# Patient Record
Sex: MALE | Race: WHITE
[De-identification: names, ages, dates, MRNs, and addresses within clinical notes are randomized per-mention and may not be internally consistent; named-entity substitution may affect disease eponyms.]

---

## 2017-05-05 ENCOUNTER — HOSPITAL ENCOUNTER (EMERGENCY)
Dept: HOSPITAL 25 - ED | Age: 82
Discharge: HOME | End: 2017-05-05
Payer: MEDICARE

## 2017-05-05 VITALS — DIASTOLIC BLOOD PRESSURE: 44 MMHG | SYSTOLIC BLOOD PRESSURE: 133 MMHG

## 2017-05-05 DIAGNOSIS — Z95.5: ICD-10-CM

## 2017-05-05 DIAGNOSIS — I25.10: ICD-10-CM

## 2017-05-05 DIAGNOSIS — F17.210: ICD-10-CM

## 2017-05-05 DIAGNOSIS — J44.9: ICD-10-CM

## 2017-05-05 DIAGNOSIS — R07.9: Primary | ICD-10-CM

## 2017-05-05 DIAGNOSIS — I71.4: ICD-10-CM

## 2017-05-05 DIAGNOSIS — I45.10: ICD-10-CM

## 2017-05-05 DIAGNOSIS — E11.9: ICD-10-CM

## 2017-05-05 LAB
ALBUMIN SERPL BCG-MCNC: 3.6 G/DL (ref 3.2–5.2)
ALP SERPL-CCNC: 123 U/L (ref 34–104)
ALT SERPL W P-5'-P-CCNC: 273 U/L (ref 7–52)
ANION GAP SERPL CALC-SCNC: 7 MMOL/L (ref 2–11)
AST SERPL-CCNC: 378 U/L (ref 13–39)
BUN SERPL-MCNC: 31 MG/DL (ref 6–24)
BUN/CREAT SERPL: 16.5 (ref 8–20)
CALCIUM SERPL-MCNC: 8.7 MG/DL (ref 8.6–10.3)
CHLORIDE SERPL-SCNC: 103 MMOL/L (ref 101–111)
GLOBULIN SER CALC-MCNC: 2.8 G/DL (ref 2–4)
GLUCOSE SERPL-MCNC: 183 MG/DL (ref 70–100)
HCO3 SERPL-SCNC: 25 MMOL/L (ref 22–32)
HCT VFR BLD AUTO: 33 % (ref 42–52)
HGB BLD-MCNC: 11 G/DL (ref 14–18)
MCH RBC QN AUTO: 33 PG (ref 27–31)
MCHC RBC AUTO-ENTMCNC: 33 G/DL (ref 31–36)
MCV RBC AUTO: 99 FL (ref 80–94)
POTASSIUM SERPL-SCNC: 4.8 MMOL/L (ref 3.5–5)
PROT SERPL-MCNC: 6.4 G/DL (ref 6.4–8.9)
RBC # BLD AUTO: 3.37 10^6/UL (ref 4–5.4)
SODIUM SERPL-SCNC: 135 MMOL/L (ref 133–145)
TROPONIN I SERPL-MCNC: 0.03 NG/ML (ref ?–0.04)
WBC # BLD AUTO: 6.4 10^3/UL (ref 3.5–10.8)

## 2017-05-05 PROCEDURE — 84484 ASSAY OF TROPONIN QUANT: CPT

## 2017-05-05 PROCEDURE — 76705 ECHO EXAM OF ABDOMEN: CPT

## 2017-05-05 PROCEDURE — 80053 COMPREHEN METABOLIC PANEL: CPT

## 2017-05-05 PROCEDURE — 36415 COLL VENOUS BLD VENIPUNCTURE: CPT

## 2017-05-05 PROCEDURE — 71010: CPT

## 2017-05-05 PROCEDURE — 93005 ELECTROCARDIOGRAM TRACING: CPT

## 2017-05-05 PROCEDURE — 85025 COMPLETE CBC W/AUTO DIFF WBC: CPT

## 2017-05-05 PROCEDURE — 85610 PROTHROMBIN TIME: CPT

## 2017-05-05 PROCEDURE — 99283 EMERGENCY DEPT VISIT LOW MDM: CPT

## 2017-05-05 PROCEDURE — 83605 ASSAY OF LACTIC ACID: CPT

## 2017-05-05 NOTE — RAD
Indication: Chest pain.



Single frontal view of the chest performed at 1500 hours was reviewed.



Comparison is made with previous exam dated July 01, 2016.



Cardiomegaly. Lung fields demonstrate no pleural fluid, pneumonia or pneumothorax.



IMPRESSION: CARDIOMEGALY WITHOUT EVIDENCE OF ACTIVE CARDIOPULMONARY DISEASE.

## 2017-05-05 NOTE — RAD
HISTORY: Abdominal pain



COMPARISONS: CT dated June 26, 2007



TECHNIQUE: Multiple transverse and longitudinal ultrasound images were obtained of the

right upper quadrant of the abdomen using grayscale and color Doppler imaging.



FINDINGS:



The study is limited by patient bowel gas.





LIVER: The liver is normal in shape, size, contour, and echogenicity. There are no focal

parenchymal masses.  There is normal hepatopedal flow of the portal vein on Doppler

imaging.

BILIARY TREE: There is ectasia of the common duct. The common duct measures 1.1 cm.

GALLBLADDER: The patient is status post cholecystectomy.



PANCREAS: The pancreas is obscured by overlying bowel gas.



RIGHT KIDNEY: The right kidney is normal in shape, size, contour, and echogenicity.  There

is no hydronephrosis or nephrolithiasis. The right kidney measures 10 x 5 x 4.4 cm. 



AORTA AND IVC: Obscured by overlying bowel     



FLUID: There are no pleural effusions. There is no free fluid within the hepatorenal

recess.



OTHER FINDINGS: None.



IMPRESSION: 

1.  LIMITED STUDY.

2.  STATUS POST MASTECTOMY.

3.  ECTASIA OF THE COMMON BILE DUCT.

## 2017-05-05 NOTE — ED
James MACIAS Benjamin, scribed for Kurt Hollye MD on 05/05/17 at 1557 .





HPI Chest Pain





- HPI Summary


HPI Summary: 





89yo male c/o chest pain last night around 11pm that lasted about 2 hours, Pt 

describes the pain as sharp and heavy. Pt has hx of CAD and cardiac stents. Pt 

had a stent placed in 1 month ago. Pt came to ED after consulting his doctor. 








- History of Current Complaint


Chief Complaint: EDChestPainROMI


Time Seen by Provider: 05/05/17 14:44


Hx Obtained From: Patient


Onset/Duration: Started Hours Ago - 18 hours ago, Resolved


Timing: Constant


Initial Severity: Moderate


Current Severity: Mild


Pain Intensity: 0


Chest Pain Location: Diffuse


Chest Pain Radiates: No


Character: Pressure/Squeezing, Sharp/Stabbing


Aggravating Factor(s): Nothing


Alleviating Factor(s): Nothing


Associated Signs and Symptoms: Positive: Negative





- Additional Pertinent History


Primary Care Physician: [facility]





- Allergy/Home Medications


Allergies/Adverse Reactions: 


 Allergies











Allergy/AdvReac Type Severity Reaction Status Date / Time


 


No Known Allergies Allergy   Verified 08/16/15 13:23














PMH/Surg Hx/FS Hx/Imm Hx


Endocrine/Hematology History: Reports: Hx Diabetes


Cardiovascular History: Reports: Hx Aneurysm - AAA, Hx Angina, Hx Coronary 

Artery Disease - STENT, Hx Hypertension, Other Cardiovascular Problems/

Disorders - CAD, IDDM II


   Denies: Hx Congestive Heart Failure


Respiratory History: Reports: Hx Chronic Obstructive Pulmonary Disease (COPD)


 History: 


   Denies: Hx Dialysis, Hx Renal Disease


Sensory History: Reports: Hx Contacts or Glasses, Hx Hearing Aid - left


Opthamlomology History: Reports: Hx Contacts or Glasses





- Cancer History


Cancer Type, Location and Year: prostate





- Surgical History


Surgery Procedure, Year, and Place: cholecystectomy, [facility] w/ 4 stents,2013, 

right leg artery replacement, anerysm stent leg,


Infectious Disease History: No


Infectious Disease History: 


   Denies: Traveled Outside the [place] in Last 30 Days





- Family History


Known Family History: Positive: Cardiac Disease





- Social History


Occupation: Retired


Lives: With Family


Alcohol Use: None


Substance Use Type: Reports: None


Hx Tobacco Use: Yes


Smoking Status (MU): Current Every Day Smoker


Type: Cigarettes


Amount Used/How Often: 12- 14 cigarettes a day


Have You Smoked in the Last Year: Yes





Review of Systems


Constitutional: Negative


Eyes: Negative


ENT: Negative


Positive: Chest Pain - resolved now


Respiratory: Negative


Gastrointestinal: Negative


Genitourinary: Negative


Musculoskeletal: Negative


Skin: Negative


Neurological: Negative


Psychological: Normal


All Other Systems Reviewed And Are Negative: Yes





Physical Exam


Triage Information Reviewed: Yes


Vital Signs On Initial Exam: 


 Initial Vitals











Temp Pulse Resp BP Pulse Ox


 


 97.1 F   51   20   151/53   100 


 


 05/05/17 14:24  05/05/17 14:24  05/05/17 14:24  05/05/17 14:24  05/05/17 14:24











Vital Signs Reviewed: Yes


Appearance: Positive: Well-Appearing, No Pain Distress, Well-Nourished


Skin: Positive: Warm, Skin Color Reflects Adequate Perfusion, Dry


Head/Face: Positive: Normal Head/Face Inspection


Eyes: Positive: Normal, EOMI, NAYELY


ENT: Positive: Normal ENT inspection, Hearing grossly normal, TMs normal


Neck: Positive: Supple, Nontender


Respiratory/Lung Sounds: Positive: Clear to Auscultation, Breath Sounds Present


Cardiovascular: Positive: RRR


Abdomen Description: Positive: Nontender, No Organomegaly, Soft


Bowel Sounds: Positive: Present


Musculoskeletal: Positive: Normal, Strength/ROM Intact


Neurological: Positive: Normal, Sensory/Motor Intact, Alert, Oriented to Person 

Place, Time, CN Intact II-III


Psychiatric: Positive: Affect/Mood Appropriate





- Schoolcraft Coma Scale


Coma Scale Total: 15





Diagnostics





- Vital Signs


 Vital Signs











  Temp Pulse Resp BP Pulse Ox


 


 05/05/17 14:59   44  27   99


 


 05/05/17 14:45   48   


 


 05/05/17 14:44   52  23   97


 


 05/05/17 14:43     172/58 


 


 05/05/17 14:27  97.9 F  50  16  151/53  99


 


 05/05/17 14:24  97.1 F  51  20  151/53  100














- Laboratory


Lab Results: 


 Lab Results











  05/05/17 05/05/17 05/05/17 Range/Units





  15:15 15:15 15:15 


 


WBC  6.4    (3.5-10.8)  10^3/ul


 


RBC  3.37 L    (4.0-5.4)  10^6/ul


 


Hgb  11.0 L    (14.0-18.0)  g/dl


 


Hct  33 L    (42-52)  %


 


MCV  99 H    (80-94)  fL


 


MCH  33 H    (27-31)  pg


 


MCHC  33    (31-36)  g/dl


 


RDW  15    (10.5-15)  %


 


Plt Count  135 L    (150-450)  10^3/ul


 


MPV  8    (7.4-10.4)  um3


 


Neut % (Auto)  81.0    (38-83)  %


 


Lymph % (Auto)  7.1 L    (25-47)  %


 


Mono % (Auto)  6.4    (1-9)  %


 


Eos % (Auto)  4.3    (0-6)  %


 


Baso % (Auto)  1.2    (0-2)  %


 


Absolute Neuts (auto)  5.2    (1.5-7.7)  10^3/ul


 


Absolute Lymphs (auto)  0.5 L    (1.0-4.8)  10^3/ul


 


Absolute Monos (auto)  0.4    (0-0.8)  10^3/ul


 


Absolute Eos (auto)  0.3    (0-0.6)  10^3/ul


 


Absolute Basos (auto)  0.1    (0-0.2)  10^3/ul


 


Absolute Nucleated RBC  0    10^3/ul


 


Nucleated RBC %  0    


 


INR (Anticoag Therapy)     (0.89-1.11)  


 


Sodium   135   (133-145)  mmol/L


 


Potassium   4.8   (3.5-5.0)  mmol/L


 


Chloride   103   (101-111)  mmol/L


 


Carbon Dioxide   25   (22-32)  mmol/L


 


Anion Gap   7   (2-11)  mmol/L


 


BUN   31 H   (6-24)  mg/dL


 


Creatinine   1.88 H   (0.67-1.17)  mg/dL


 


Est GFR ( Amer)   43.8   (>60)  


 


Est GFR (Non-Af Amer)   34.0   (>60)  


 


BUN/Creatinine Ratio   16.5   (8-20)  


 


Glucose   183 H   ()  mg/dL


 


Lactic Acid    2.0  (0.5-2.0)  mmol/L


 


Calcium   8.7   (8.6-10.3)  mg/dL


 


Total Bilirubin   1.10 H   (0.2-1.0)  mg/dL


 


AST   378 H   (13-39)  U/L


 


ALT   273 H   (7-52)  U/L


 


Alkaline Phosphatase   123 H   ()  U/L


 


Troponin I   0.03   (<0.04)  ng/mL


 


Total Protein   6.4   (6.4-8.9)  g/dL


 


Albumin   3.6   (3.2-5.2)  g/dL


 


Globulin   2.8   (2-4)  g/dL


 


Albumin/Globulin Ratio   1.3   (1-3)  














  05/05/17 Range/Units





  15:15 


 


WBC   (3.5-10.8)  10^3/ul


 


RBC   (4.0-5.4)  10^6/ul


 


Hgb   (14.0-18.0)  g/dl


 


Hct   (42-52)  %


 


MCV   (80-94)  fL


 


MCH   (27-31)  pg


 


MCHC   (31-36)  g/dl


 


RDW   (10.5-15)  %


 


Plt Count   (150-450)  10^3/ul


 


MPV   (7.4-10.4)  um3


 


Neut % (Auto)   (38-83)  %


 


Lymph % (Auto)   (25-47)  %


 


Mono % (Auto)   (1-9)  %


 


Eos % (Auto)   (0-6)  %


 


Baso % (Auto)   (0-2)  %


 


Absolute Neuts (auto)   (1.5-7.7)  10^3/ul


 


Absolute Lymphs (auto)   (1.0-4.8)  10^3/ul


 


Absolute Monos (auto)   (0-0.8)  10^3/ul


 


Absolute Eos (auto)   (0-0.6)  10^3/ul


 


Absolute Basos (auto)   (0-0.2)  10^3/ul


 


Absolute Nucleated RBC   10^3/ul


 


Nucleated RBC %   


 


INR (Anticoag Therapy)  1.01  (0.89-1.11)  


 


Sodium   (133-145)  mmol/L


 


Potassium   (3.5-5.0)  mmol/L


 


Chloride   (101-111)  mmol/L


 


Carbon Dioxide   (22-32)  mmol/L


 


Anion Gap   (2-11)  mmol/L


 


BUN   (6-24)  mg/dL


 


Creatinine   (0.67-1.17)  mg/dL


 


Est GFR ( Amer)   (>60)  


 


Est GFR (Non-Af Amer)   (>60)  


 


BUN/Creatinine Ratio   (8-20)  


 


Glucose   ()  mg/dL


 


Lactic Acid   (0.5-2.0)  mmol/L


 


Calcium   (8.6-10.3)  mg/dL


 


Total Bilirubin   (0.2-1.0)  mg/dL


 


AST   (13-39)  U/L


 


ALT   (7-52)  U/L


 


Alkaline Phosphatase   ()  U/L


 


Troponin I   (<0.04)  ng/mL


 


Total Protein   (6.4-8.9)  g/dL


 


Albumin   (3.2-5.2)  g/dL


 


Globulin   (2-4)  g/dL


 


Albumin/Globulin Ratio   (1-3)  











Result Diagrams: 


 05/05/17 15:15





 05/05/17 15:15


Lab Statement: Any lab studies that have been ordered have been reviewed, and 

results considered in the medical decision making process.





- Radiology


  ** CXR


Xray Interpretation: No Acute Changes - IMPRESSION: CARDIOMEGALY WITHOUT 

EVIDENCE OF ACTIVE CARDIOPULMONARY DISEASE.


Radiology Interpretation Completed By: Radiologist





- Ultrasound


  ** No standard instances


Ultrasound Interpretation: No Acute Changes - US Abdomen - IMPRESSION:  1.  

LIMITED STUDY. 2.  STATUS POST MASTECTOMY. 3.  ECTASIA OF THE COMMON BILE DUCT.


Ultrasound Interpretation Completed By: Radiologist





- EKG


  ** 1431.


Cardiac Rate: Bradycardia


EKG Rhythm: Sinus Bradycardia


EKG Interpretation: RBBB. 


EKG Comparison: No Significant Change - compared to 7/2/16





Chest Pain Course/Dx





- Course


Course Of Treatment: Mr. Fuentes came in about 12 hours after an episode of 

chest pain that lasted a couple hours. His W/U was unremarkable for cardiac 

issues but I did find that his transaminases were elevated.  I R/U'd a common 

bile stone with U/S.  It is possible that his high dose of lipitor is 

responsible and I recommended that he hold it and F/U first of the week.





- Diagnoses


Provider Diagnoses: 


 Chest pain








- Provider Notifications


Discussed Care Of Patient With: Dr. Corbin (Interventionalist) @5630.





Discharge





- Discharge Plan


Condition: Stable


Disposition: HOME


Patient Education Materials:  Chest Pain (ED)


Referrals: 


Lori Hitchcock MD [Medical Doctor] - 


Additional Instructions: 


Your liver enzymes were a little elevated today.  This could be from your 

cholesterol medication and I would recommend holding it until the first of the 

week when you should follow up with your doctor at [facility].





The documentation as recorded by the James hammer Benjamin accurately reflects 

the service I personally performed and the decisions made by me, Kutr Holley MD.

## 2018-05-07 ENCOUNTER — HOSPITAL ENCOUNTER (OUTPATIENT)
Dept: HOSPITAL 25 - ED | Age: 83
Setting detail: OBSERVATION
LOS: 1 days | Discharge: HOME | End: 2018-05-08
Attending: HOSPITALIST | Admitting: INTERNAL MEDICINE
Payer: MEDICARE

## 2018-05-07 DIAGNOSIS — E83.42: ICD-10-CM

## 2018-05-07 DIAGNOSIS — R79.89: ICD-10-CM

## 2018-05-07 DIAGNOSIS — I10: ICD-10-CM

## 2018-05-07 DIAGNOSIS — I12.9: ICD-10-CM

## 2018-05-07 DIAGNOSIS — I25.10: ICD-10-CM

## 2018-05-07 DIAGNOSIS — Z95.9: ICD-10-CM

## 2018-05-07 DIAGNOSIS — N40.1: ICD-10-CM

## 2018-05-07 DIAGNOSIS — R47.89: Primary | ICD-10-CM

## 2018-05-07 DIAGNOSIS — N18.3: ICD-10-CM

## 2018-05-07 DIAGNOSIS — J44.9: ICD-10-CM

## 2018-05-07 DIAGNOSIS — E78.5: ICD-10-CM

## 2018-05-07 DIAGNOSIS — R74.8: ICD-10-CM

## 2018-05-07 DIAGNOSIS — E11.22: ICD-10-CM

## 2018-05-07 DIAGNOSIS — R25.1: ICD-10-CM

## 2018-05-07 DIAGNOSIS — Z79.82: ICD-10-CM

## 2018-05-07 DIAGNOSIS — F32.9: ICD-10-CM

## 2018-05-07 LAB
BASOPHILS # BLD AUTO: 0.1 10^3/UL (ref 0–0.2)
EOSINOPHIL # BLD AUTO: 0.2 10^3/UL (ref 0–0.6)
HCT VFR BLD AUTO: 34 % (ref 42–52)
HGB BLD-MCNC: 11.5 G/DL (ref 14–18)
INR PPP/BLD: 0.9 (ref 0.77–1.02)
LYMPHOCYTES # BLD AUTO: 0.9 10^3/UL (ref 1–4.8)
MCH RBC QN AUTO: 34 PG (ref 27–31)
MCHC RBC AUTO-ENTMCNC: 34 G/DL (ref 31–36)
MCV RBC AUTO: 99 FL (ref 80–94)
MONOCYTES # BLD AUTO: 0.7 10^3/UL (ref 0–0.8)
NEUTROPHILS # BLD AUTO: 6.2 10^3/UL (ref 1.5–7.7)
NRBC # BLD AUTO: 0 10^3/UL
NRBC BLD QL AUTO: 0.1
PLATELET # BLD AUTO: 151 10^3/UL (ref 150–450)
RBC # BLD AUTO: 3.44 10^6/UL (ref 4–5.4)
WBC # BLD AUTO: 8.1 10^3/UL (ref 3.5–10.8)

## 2018-05-07 PROCEDURE — 85610 PROTHROMBIN TIME: CPT

## 2018-05-07 PROCEDURE — 80061 LIPID PANEL: CPT

## 2018-05-07 PROCEDURE — 85730 THROMBOPLASTIN TIME PARTIAL: CPT

## 2018-05-07 PROCEDURE — 86901 BLOOD TYPING SEROLOGIC RH(D): CPT

## 2018-05-07 PROCEDURE — G0480 DRUG TEST DEF 1-7 CLASSES: HCPCS

## 2018-05-07 PROCEDURE — 84439 ASSAY OF FREE THYROXINE: CPT

## 2018-05-07 PROCEDURE — 82550 ASSAY OF CK (CPK): CPT

## 2018-05-07 PROCEDURE — 96375 TX/PRO/DX INJ NEW DRUG ADDON: CPT

## 2018-05-07 PROCEDURE — 80048 BASIC METABOLIC PNL TOTAL CA: CPT

## 2018-05-07 PROCEDURE — 83516 IMMUNOASSAY NONANTIBODY: CPT

## 2018-05-07 PROCEDURE — 83605 ASSAY OF LACTIC ACID: CPT

## 2018-05-07 PROCEDURE — 84479 ASSAY OF THYROID (T3 OR T4): CPT

## 2018-05-07 PROCEDURE — 85652 RBC SED RATE AUTOMATED: CPT

## 2018-05-07 PROCEDURE — 86200 CCP ANTIBODY: CPT

## 2018-05-07 PROCEDURE — 70450 CT HEAD/BRAIN W/O DYE: CPT

## 2018-05-07 PROCEDURE — 36415 COLL VENOUS BLD VENIPUNCTURE: CPT

## 2018-05-07 PROCEDURE — G0378 HOSPITAL OBSERVATION PER HR: HCPCS

## 2018-05-07 PROCEDURE — 83735 ASSAY OF MAGNESIUM: CPT

## 2018-05-07 PROCEDURE — 96374 THER/PROPH/DIAG INJ IV PUSH: CPT

## 2018-05-07 PROCEDURE — 94640 AIRWAY INHALATION TREATMENT: CPT

## 2018-05-07 PROCEDURE — 99285 EMERGENCY DEPT VISIT HI MDM: CPT

## 2018-05-07 PROCEDURE — 87086 URINE CULTURE/COLONY COUNT: CPT

## 2018-05-07 PROCEDURE — 86900 BLOOD TYPING SEROLOGIC ABO: CPT

## 2018-05-07 PROCEDURE — 86038 ANTINUCLEAR ANTIBODIES: CPT

## 2018-05-07 PROCEDURE — 84484 ASSAY OF TROPONIN QUANT: CPT

## 2018-05-07 PROCEDURE — 80053 COMPREHEN METABOLIC PANEL: CPT

## 2018-05-07 PROCEDURE — 86140 C-REACTIVE PROTEIN: CPT

## 2018-05-07 PROCEDURE — 93306 TTE W/DOPPLER COMPLETE: CPT

## 2018-05-07 PROCEDURE — 82140 ASSAY OF AMMONIA: CPT

## 2018-05-07 PROCEDURE — 93005 ELECTROCARDIOGRAM TRACING: CPT

## 2018-05-07 PROCEDURE — 71045 X-RAY EXAM CHEST 1 VIEW: CPT

## 2018-05-07 PROCEDURE — 85025 COMPLETE CBC W/AUTO DIFF WBC: CPT

## 2018-05-07 PROCEDURE — 95819 EEG AWAKE AND ASLEEP: CPT

## 2018-05-07 PROCEDURE — 80320 DRUG SCREEN QUANTALCOHOLS: CPT

## 2018-05-07 PROCEDURE — 86850 RBC ANTIBODY SCREEN: CPT

## 2018-05-07 PROCEDURE — 84443 ASSAY THYROID STIM HORMONE: CPT

## 2018-05-07 PROCEDURE — 70551 MRI BRAIN STEM W/O DYE: CPT

## 2018-05-07 PROCEDURE — 81003 URINALYSIS AUTO W/O SCOPE: CPT

## 2018-05-07 PROCEDURE — 87040 BLOOD CULTURE FOR BACTERIA: CPT

## 2018-05-07 PROCEDURE — 81015 MICROSCOPIC EXAM OF URINE: CPT

## 2018-05-07 PROCEDURE — 93880 EXTRACRANIAL BILAT STUDY: CPT

## 2018-05-07 PROCEDURE — 80307 DRUG TEST PRSMV CHEM ANLYZR: CPT

## 2018-05-07 RX ADMIN — INSULIN LISPRO SCH: 100 INJECTION, SOLUTION INTRAVENOUS; SUBCUTANEOUS at 13:35

## 2018-05-07 RX ADMIN — BUPROPION HYDROCHLORIDE SCH MG: 150 TABLET, EXTENDED RELEASE ORAL at 20:37

## 2018-05-07 RX ADMIN — HEPARIN SODIUM SCH UNITS: 5000 INJECTION INTRAVENOUS; SUBCUTANEOUS at 14:51

## 2018-05-07 RX ADMIN — HEPARIN SODIUM SCH UNITS: 5000 INJECTION INTRAVENOUS; SUBCUTANEOUS at 21:45

## 2018-05-07 RX ADMIN — INSULIN LISPRO SCH UNIT: 100 INJECTION, SOLUTION INTRAVENOUS; SUBCUTANEOUS at 17:32

## 2018-05-07 RX ADMIN — METOPROLOL TARTRATE PRN MG: 5 INJECTION, SOLUTION INTRAVENOUS at 10:38

## 2018-05-07 RX ADMIN — METOPROLOL TARTRATE PRN MG: 5 INJECTION, SOLUTION INTRAVENOUS at 20:45

## 2018-05-07 NOTE — HP
CC:  Dr. Lori Hitchcock; Dr. Sheron Bethea, [place], [place] *

 

HISTORY AND PHYSICAL:

 

DATE OF ADMISSION:  05/07/18

 

PRIMARY CARE PROVIDER:  Dr. Lori Hitchcock, [place] and Dr. Sheron Bethea, 
[place].

 

ATTENDING PHYSICIAN:  Dr. Aria Jones * (dictated by Miriam Farr, DANIELE).

 

CHIEF COMPLAINT:  Trouble speaking and tremors.

 

HISTORY OF PRESENT ILLNESS:  Mr. Fuentes is an 89-year-old male with past 
medical history significant for coronary artery disease; peripheral vascular 
disease; diabetes mellitus, non-insulin dependent; prostate cancer, status post 
radiation; hypertension; COPD; pulmonary fibrosis, who states that he had been 
in his usual state of health until last night.  The patient states that 5 or 6 
days ago, he was in [place] and seen by his primary care provider.  He then 
travelled to [place] where he spends his summers.  The patient's wife went 
into the hospital 2 days ago.  The patient denies any recent fevers, chest pain
, shortness of breath, nausea, vomiting, diarrhea.  He states he had dizziness 
last evening, has not been sleeping well.  His blood pressures have been 
elevated.  At approximately 3 a.m. this morning, he noticed he was having whole 
body tremors and trouble speaking. This lasted for approximately an hour and a 
half and he called EMS.  Please note that the patient states he has not been 
taking his home medications accordingly since his wife has been in the 
hospital.  He states that his diabetes and blood pressure are well controlled 
when he takes his medications accordingly.  According to EMS, the patient was 
having stuttering speech during his tremors.  He was found to have a blood 
pressure of 190/110 and he was brought to the emergency room for further 
evaluation.

 

While in the emergency room, the patient continued to be noted to be 
hypertensive with systolic blood pressures in the 200s.  He had a brain CT 
showing no acute findings.  He had a chest x-ray showing cardiomegaly, no acute 
findings.  He had an EKG with no changes from previous EKG from 2017.  Due to 
the patient's neurological complaints, Neurology was consulted.  The patient 
was a tPA candidate due to his symptoms being vague and no exact window.  Due 
to his age, he is not an intervention candidate.  Hospitalists were asked to 
evaluate the patient for admission.

 

PAST MEDICAL HISTORY:

1.  Coronary artery disease.

2.  Peripheral vascular disease.

3.  Macular degeneration.

4.  Non-insulin dependent diabetes mellitus.

5.  Prostate cancer, status post radiation.

6.  Hypertension.

7.  COPD.

8.  Pulmonary fibrosis.

9.  Abdominal aortic aneurysm.

10.  Anxiety.

11.  Heart Failure.

12.  Tobacco Abuse.



PAST SURGICAL HISTORY:

1.  Status post cardiac stenting x4.

2.  Status post bilateral lower extremity stenting.

3.  Status post AAA repair.

4.  Status post tonsillectomy.

 

HOME MEDICATIONS:  Include:

1.  ICaps 1 tablet oral twice daily.

2.  Percocet 5-325 mg 1 tab oral daily as needed for pain.

3.  Spiriva 18 mcg 1 capsule inhalation daily.

4.  Neomycin-polymyxin-hydrocort 4 drops into the affected ear 3 times daily.

5.  Glimepiride 2 mg oral daily.

6.  Atorvastatin 10 mg oral daily.

7.  Cinnamon 500 mg oral daily.

8.  Fish oil 300-1,000 mg 1 capsule oral daily.

9.  Nitroglycerin 0.4 mg sublingual every 5 minutes as needed for chest pain.

10.  Aspirin 81 mg oral daily.

11.  Wellbutrin  mg oral twice daily.

12.  Losartan/hydrochlorothiazide 50/12.5 1 tab oral daily.

13.  Plavix 75 mg oral daily.

14.  Flomax 0.4 mg oral daily at bedtime.

15.  Xanax 0.5 mg oral twice daily as needed for anxiety.

16.  Metoprolol tartrate 25 mg oral twice daily.

17.  Biotin 10,000 mg oral daily.

 

ALLERGIES:  PREDNISONE.

 

FAMILY HISTORY:  The patient's father had a history of coronary artery disease 
and a CVA.  He denies any family history of diabetes mellitus or cancer.

 

SOCIAL HISTORY:  The patient is a current smoker, smoking approximately 5 
cigarettes daily.  He has a 70-year smoking history.  He rarely drinks alcohol. 
Denies recreational drug use.  The patient's wife, Tonie Fuentes, will be 
his surrogate decision maker in the event he is unable to make decisions for 
himself.

 

REVIEW OF SYSTEMS:  I performed an 11-point review of systems.  All the 
pertinent positives and negatives are mentioned in the history of present 
illness.  The remaining review of systems is negative.

 

                               PHYSICAL EXAMINATION

 

GENERAL APPEARANCE:  The patient is alert, pleasant and appears to be in no 
acute distress.

 

VITAL SIGNS:  Temperature 98.4, heart rate 69, respiratory rate 23, O2 sat 93% 
on room air, blood pressure 186/90.

 

HEENT:  Normocephalic, atraumatic.  Pupils are equal and reactive to light. 
Extraocular movements are intact.

 

RESPIRATORY:  There is no accessory muscle use.  The lungs are clear to 
auscultation bilateral.

 

CARDIOVASCULAR:  Regular rate and rhythm.  S1 and S2 present.  There are no 
murmurs, rubs, or gallops heard.

 

ABDOMEN:  Soft, nontender, nondistended.  Bowel sounds present x4.

 

EXTREMITIES:  There is trace bilateral lower extremity edema.  DP and PT pulses 
are 2+ and symmetric.

 

MUSCULOSKELETAL:  There is no clubbing or cyanosis noted.  The patient exhibits 
good strength in all extremities.

 

NEUROLOGIC:  The patient is alert and oriented x3.  Cranial nerves II through 
XII are grossly intact.  The patient has strong and equal plantar and dorsiflex 
bilaterally.  He is able to lift both legs off the bed.  He has no pronator 
drift. His handgrips are equal.  His smile is symmetric.  His tongue is 
midline.  The patient is able to do bilateral heel from ankle to knee.  He does 
have a mild ataxia with this on the left side.  The patient has stuttering 
speech.  No tremors noted.

 

PSYCHOLOGICAL:  The patient is calm and cooperative.

 

SKIN:  There are no rashes or abnormalities seen.

 

 DIAGNOSTIC STUDIES/LABORATORY DATA:  Sodium 140, potassium 4.2, chloride 105, 
CO2 28, BUN 38, creatinine 1.75, glucose 198, magnesium 1.7.  White blood cell 
count 8.1, hemoglobin 11.5, hematocrit 34, and platelet count 151.  ESR 51.  
Troponin 0.05 and 0.04.

 

EKG shows a sinus rhythm and a rate of 76.  There is a right bundle-branch 
block and a prolonged QTc.  There are T-wave inversions in V2 and 3 and ST 
depression in V4 to V6 in lead II.  This is all similar to previous EKG from 06/
03/17.

 

Chest x-ray from today. Radiologist's impression: Cardiomegaly.  No evidence of 
alveolar consolidation.

 

Brain CT from today.  Radiologist's impression:  No acute intracranial process 
evident.  Involutional change and stigmata of chronic small vessel ischemic 
disease.

 

IMPRESSION:  Mr. Fuentes is an 89-year-old male with past medical history 
significant for coronary artery disease, peripheral vascular disease, macular 
degeneration, diabetes mellitus, prostate cancer, hypertension, chronic 
obstructive pulmonary disease, pulmonary fibrosis, who presented to the 
emergency room with complaints of whole body tremors and trouble speaking.  He 
will be admitted as an observation for further workup to rule out 
cerebrovascular accident.

 

ASSESSMENT/PLAN:

1.  Stuttering speech and tremors.  The patient had an EEG while in the 
emergency room.  Per Neurology, does not show any seizure activity.  The patient
's head CT was negative.  He was hypertensive in the ER.  Some of his symptoms 
could be secondary to hypertensive encephalopathy and the patient reports not 
taking his blood pressure medications.  For now, I am going to just place him 
on metoprolol tartrate IV every 4 hours for systolic blood pressures greater 
than 190 as I would like to allow for permissive hypertension in the setting of 
a possible cerebrovascular accident.  We will get an MRI without contrast.  We 
will get bilateral carotid Dopplers.  The patient will be continued on his 
aspirin and Plavix.  We will get an echo with a bubble study.  Neurology will 
see the patient later today.

2.  Elevated troponin.  The patient denies any chest pain.  He has no EKG 
changes. I suspect this is his baseline elevated troponin in the setting of 
chronic kidney disease.

3.  Chronic kidney disease stage 3.  The patient's creatinine appears to be 
near his baseline.

4.  Hypomagnesium.  The patient received magnesium replacement in the ER.  We 
will recheck his magnesium level in the morning.

5.  Diabetes mellitus.  The patient will have glucose checks a.c. and h.s. with 
lispro sliding scale coverage.  We will hold his glimepiride while he is in the 
hospital.

6.  Hypertension.  The patient has been hypertensive while in the emergency room
, but again we are going to allow permissive hypertension in the setting of a 
possible cerebrovascular accident.  We will hold the patient's metoprolol, 
losartan and hydrochlorothiazide for now and use metoprolol tartrate IV every 4 
hours as needed for systolic blood pressures greater than 190.

7.  Hyperlipidemia.  Continue home statin.

8.  History of coronary artery disease. Continue home aspirin, Plavix, 
metoprolol, and statin.

9.  Hyperlipidemia.  We will check fasting lipids in the morning.  For now, he 
will be continued on his home atorvastatin.

10.  History of prostate cancer.  Status post radiation.  We will continue the 
patient's Flomax for his benign prostatic hyperplasia as a result of his 
prostate cancer.

11.  Chronic obstructive pulmonary disease.  The patient has no signs of an 
acute exacerbation at this time.  Continue home Spiriva.

12.  Heart Failure. No signs of acute exacerbation at this time.

13.  Tobacco abuse. We will offer Nicotine replacement. He has been encouraged 
to stop smoking.

14.  Fluids, electrolytes, and nutrition:  The patient will be on a heart-
healthy diet.

15.  Code status:  Full code.

16.  DVT prophylaxis:  The patient is at highest risk.  He will have subcu 
heparin and TEDs.

17.  Disposition:  Observation.

 

TIME SPENT:  Time for this admission was approximately 60 minutes, greater than 
half of that was spent with the patient discussing medications, past medical 
history, the events leading up to his arrival today and performing a physical 
examination.

 

The case has been reviewed with the attending, Dr. Jones, who agrees with the 
plan of care.

 

Reviewed by JOSE GROVE  05/09/18  0936

 

[id number]/CPS #: [id number]

CHELSEY

## 2018-05-07 NOTE — ECHO
Patient:      NOHEMY WHITLOCK

Med Rec#:     [id number]            :          [id number]          

Date:         2018            Age:          89y                 

Account#:     [id number]          Height:       182.88 cm / 72.0 in

Accession#:   [id number]           Weight:       90.72 kg / 199.9 lbs

Sex:          M                     BSA:          2.13

Room#:        [facility]                 

Admit Date#:  2018          

Type:         Inpatient

 

Referring:    Miriam Sky NP

Reading:      Moustapha Samson MD

Sonographer:  Miriam Da Silva RDCS

CC:           Ezequiel Reed MD

______________________________________________________________________

 

Transthoracic Echocardiogram

 

Indication:

TIA

BP:           187/78

HR:           65

Rhythm:       NSR

 

Findings     

History:

CAD, S/P PCI, PVD, DM, HTN, COPD, pulmonary fibrosis, smoker, AAA

repair, angina. 

 

Technical Comments:

The study quality is fair.  The study is technically limited due to poor

parasternal windows.  Completed at 1445. 

 

Left Ventricle:

The left ventricular chamber size is normal. Moderate concentric left

ventricular hypertrophy is observed. There is a prominent septal

knuckle. Global left ventricular wall motion and contractility are

within normal limits. There is normal left ventricular systolic

function. The estimated ejection fraction is 55-60%.  Abnormal left

ventricular diastolic function is observed. Abnormal left ventricular

diastolic filling is observed, consistent with impaired relaxation.  

 

Left Atrium:

The left atrium is mildly dilated. 

 

Right Ventricle:

Moderator Band present. The right ventricle is mild to moderately

dilated.  The right ventricle wall thickness is mildly increased. The

right ventricular global systolic function is normal. 

 

Right Atrium:

The right atrial cavity size is normal. There were late bubbles seen in

the left atrium.NOt consistant with PFO 

 

Aortic Valve:

The aortic valve is trileaflet. The aortic valve leaflets are mildly

thickened. There is evidence of aortic sclerosis without stenosis. There

is no evidence of aortic regurgitation. There is no evidence of aortic

stenosis. 

 

Mitral Valve:

The mitral valve leaflets are mildly thickened. There is mild mitral

regurgitation.  There is no evidence of mitral stenosis. 

 

Tricuspid Valve:

The tricuspid valve leaflets are normal.  There is trace tricuspid

regurgitation.  Unable to estimate the right ventricular systolic

pressure.   There is no tricuspid stenosis. 

 

Pulmonic Valve:

The pulmonic valve appears normal. There is trace to mild pulmonic

regurgitation. There is no pulmonic stenosis.  

 

Pericardium:

There is no significant pericardial effusion. 

 

Aorta:

There is mild dilatation of the ascending aorta. The aortic arch is not

well visualized.  There is moderate dilatation of the aortic root. 

 

Pulmonary Artery:

The main pulmonary artery appears normal. 

 

Venous:

The inferior vena cava is dilated.  There is an approximate 50%

respiratory change in the inferior vena cava dimension. 

 

Contrast:

Normal saline was used as contrast for the bubble study.  Images 70 and

71. Intravenous contrast was used to help determine presence of

intracardiac shunting. 

 

Conclusions

Global left ventricular wall motion and contractility are within normal

limits.

There is normal left ventricular systolic function.

The estimated ejection fraction is 55-60%. 

The right ventricular global systolic function is normal.

There were late bubbles seen in the left atrium.NOt consistant with PFO

There is evidence of aortic sclerosis without stenosis.

There is mild mitral regurgitation. 

There is trace tricuspid regurgitation. 

Unable to estimate the right ventricular systolic pressure.  

 

Measurements     

Name                    Value         Normal Range            

RVIDd (AP) 2D           4.2 cm        (0.9 - 2.6)             

RVDdMajor (2D)          4.9 cm        (2.2 - 4.4)             

RVAW (2D)               0.7 cm        (0.2 - 0.5)             

RAd ISD 4CH             4.8 cm        (3.4 - 4.9)             

RA (A4C)W               3.7 cm        (2.9 - 4.6)             

IVSd (2D)               1.5 cm        (0.6 - 1)               

LVPWd (2D)              1.5 cm        (0.6 - 1)               

LVIDd (2D)              5 cm          (3.6 - 5.4)             

LVIDs (2D)              3.8 cm        -                        

LV FS (2D)              25 %          (25 - 45)               

Aortic Annulus          2.1 cm        (1.4 - 2.6)             

Ao root diameter (2D)   4.4 cm        (2.1 - 3.5)             

Ascending Ao            3.5 cm        (2.1 - 3.4)             

LA dimension (AP) 2D    4.2 cm        (2.3 - 3.8)             

LAd ISD 4CH             5 cm          (2.9 - 5.3)             

LA ISD 4CH W            4.2 cm        (2.5 - 4.5)             

 

Name                    Value         Normal Range            

LA ESV SP 4CH (A/L)     52 ml         -                        

LA ESV SP 2CH (A/L)     94 ml         -                        

LA ESV BP (A/L)         72 ml         -                        

LA ESV BP (A/L) index   34 ml/m2      -                        

LA ESV SP 4CH (MOD)     46 ml         -                        

LA ESV SP 2CH (MOD)     82 ml         -                        

 

Name                    Value         Normal Range            

MV E-wave Vmax          0.6 m/sec     -                        

MV deceleration time    409.8 msec    -                        

MV A-wave Vmax          1.2 m/sec     -                        

MV E:A ratio            0.49 ratio    -                        

LV septal e' Vmax       0.04 m/sec    -                        

LV lateral e' Vmax      0.04 m/sec    -                        

LV E:e' septal ratio    15 ratio      -                        

LV E:e' lateral ratio   15 ratio      -                        

 

Name                    Value         Normal Range            

AV Vmax                 1.2 m/sec     -                        

AV VTI                  29.67 cm      -                        

AV peak gradient        5.56 mmHg     -                        

AV mean gradient        3.03 mmHg     -                        

LVOT diameter           2 cm          -                        

LVOT Vmax               0.71 m/sec    -                        

LVOT VTI                19.67 cm      -                        

LVOT peak gradient      2.03 mmHg     -                        

LVOT mean gradient      1.21 mmHg     -                        

AARON Vmax                0.42 m/sec    -                        

 

Name                    Value         Normal Range            

IVC diameter            2.6 cm        -                        

 

Name                    Value         Normal Range            

PV Vmax                 0.71 m/sec    -                        

PV peak gradient        2.05 mmHg     -                        

 

Electronically signed by: Moustapha Samson MD on 2018 17:49:00

## 2018-05-07 NOTE — EEG
ELECTROENCEPHALOGRAPHY:

 

DATE OF STUDY:  05/07/18

 

He is in the emergency room, to be admitted.

 

REFERRING PROVIDER:  Dr. Dewitt.

 

CLINICAL PROBLEM:  Episodes of difficulty speaking, starting upon waking this 
morning.  The patient apparently has stuttering speech.

 

MEDICATIONS:  Not yet known.

 

REPORT:  This 16-channel EEG is remarkable for background rhythms consisting of 
an occipital rhythm at about 6 to 7 cycles per second, which is fairly 
symmetric. Muscle artifacts are seen frequently with chin and mouth tremors and 
mouth movements periodically throughout the recording.  During quieter portions 
of the tracing, rhythms are fairly symmetrical and mainly in the theta and 
deltoid range. The patient drowses and sleeps frequently with some vertex 
slowing and sleep spindles seen fairly frequently. The patient snores and has 
chin movements during the sleep as well.  Activation procedure was not 
attempted.  The patient was woken at the end of the recording with previously 
described waking rhythms resuming. There are no focal or epileptiform 
abnormalities.

 

CLINICAL IMPRESSION:  Abnormal EEG due to generalized slowing of the background 
rhythms, consistent with diffuse cerebral dysfunction.  There were no focal or 
epileptiform features to this recording.

 

[id number] #: [id number]

MTDD

## 2018-05-07 NOTE — RAD
Indication: Hypertension, seizures.



Single frontal view of the chest performed at 0835 hours was reviewed.



Comparison is made with previous exam dated June 03, 2017.



Cardiomegaly is noted. No alveolar consolidation is noted. No pneumothorax is noted.



IMPRESSION: CARDIOMEGALY. NO EVIDENCE OF ALVEOLAR CONSOLIDATION IS NOTED.

## 2018-05-07 NOTE — RAD
Indication: Facial twitching. Question seizure.



Comparison: June 05, 2017 MRI brain and June 03, 2017 CT brain.



Technique: Noncontrast CT vertex of skull through foramen magnum.



Report: Mild to moderate prominence of the cerebral sulci most prominent along the

bilateral sylvian fissures. Proportional enlargement of the ventricles. Patent basal

cisterns. Decreased density in the periventricular and subcortical white matter while

non-specific is most likely due to chronic microangiopathy. Negative for gray matter white

matter obscuration, intra or extra-axial hemorrhage, or mass effect. No fracture or

suspicious lesion of the calvarium or skull base. Clear visualized paranasal sinuses and

mastoid air spaces. Unremarkable scalp.



IMPRESSION: 

1. No acute intracranial process evident.

2. Involutional change and stigmata of chronic small vessel ischemic disease.

 

Results discussed with Dr. Armando 5/7/2018 7:45 AM EDT

## 2018-05-07 NOTE — RAD
INDICATION: Difficulty speaking. Evaluate for CVA. No acute CT findings on concurrent CT

brain



COMPARISON: CT brain same date; MRI brain June 05, 2017

 

TECHNIQUE: sagittal T1 FLAIR, axial diffusion, axial T1 FLAIR, axial T2, axial T2 FLAIR,

and SWI images were acquired.



FINDINGS: 



Craniocervical junction: The craniocervical junction appears normal.



Ventricles/sulci: There is cortical atrophy with compensatory dilatation of the CSF

spaces. 



Brain parenchyma: There are T2-weighted hyperintensities in the periventricular and

subcortical white matter consistent with chronic microvascular ischemia.



Intracranial hemorrhage: There is no intracranial hemorrhage.



Extra-axial spaces: There are no extra-axial fluid collections or masses.



Orbits: There are no MR abnormalities of the orbital structures. 



Paranasal sinuses/mastoid: The paranasal sinuses are clear. There is a small amount of

fluid in the mastoid air cells bilaterally which could be related to mastoiditis. The

appearance is unchanged.



Vascular: No abnormalities are seen.



Other: None



IMPRESSION:  CORTICAL ATROPHY WITH CHRONIC MICROVASCULAR ISCHEMIC CHANGES. NO FINDINGS OF

ACUTE ISCHEMIA. CHRONIC, SMALL AMOUNT BILATERAL MASTOID AIR CELL EFFUSIONS

## 2018-05-07 NOTE — CONS
[facility]  [facility]

 

NEUROLOGY CONSULTATION:

 

DATE OF CONSULT:  05/07/18

 

LOCATION:  He is an inpatient in room 443.

 

REFERRING PROVIDER:  Miriam Toussaint NP

 

CHIEF COMPLAINT:  Tremors, difficulty speaking.

 

HISTORY OF PRESENT ILLNESS:  Servando Fuentes is an 89-year-old right-handed 
man, who woke up in the early morning hours yesterday with feeling extremely 
cold.  He started to have generalized shaking.  There was no loss of 
consciousness and he did not have any pain anywhere.  The shaking persisted and 
he apparently called an ambulance at 3 in the morning.  He was brought into the 
hospital where he would exhibit generalized shaking of his upper extremities 
and face and had stuttering speech.  He was quite hypertensive with systolic 
over 200 and diastolic over 100. He was given some labetalol with some 
improvement in his blood pressures, but the shaking persisted.  He had a 
teleneurology consult from the [facility] and was 
thought not to be a tPA candidate due to vagueness of symptoms and no clear 
time window.  I do not have that consultation, but reading that from Dr. Armando'
s note and Miriam Toussaint's admission.  The patient says that he had 
been feeling well as he just returned from [place].  His wife ended up in the 
hospital down in [place], [place] with sounds like an acute intestinal 
disorder.  He says that she is 88 and surgery was contemplated, but indicates 
that at her age and his age that is not likely to be done.  He also says that 
his dog who is 14 years of age was injured or had some type of medical problem 
when being transported up from [place].  He gets tearful describing these 
problems. When I entered the room, he was asleep and there are no involuntary 
movements, but as we spoke and we talked, he started to exhibit movements, 
which will be described below.

 

PAST MEDICAL HISTORY:  Notable for coronary artery disease, peripheral vascular 
disease, macular degeneration, type 2 diabetes, prostate cancer, status post 
radiation, hypertension, pulmonary fibrosis, abdominal aortic aneurysm repair, 
cardiac stenting, lower extremity stenting.

 

MEDICATIONS:  He was not taking his medications at home for at least 3 days.  
He resumed his medicines about a day ago.  Medicines he is supposed to be on at 
home include:

 

1.  Plavix 75 mg p.o. daily.

2.  Xanax 0.5 mg p.o. b.i.d. p.r.n. anxiety.

3.  Metoprolol 25 mg p.o. b.i.d.

4.  Losartan/hydrochlorothiazide 50/12.5 one p.o. daily.

5.  Wellbutrin  mg p.o. b.i.d.

6.  Aspirin 81 mg p.o. daily.

7.  Atorvastatin 80 mg p.o. daily.

8.  Amlodipine 10 mg p.o. daily.

9.  Glimepiride 1 mg p.o. daily.

10.  Tramadol 50 mg p.o. q.6 hours p.r.n. pain.

 

ALLERGIES:  He is said to be allergic to PREDNISONE.  When I looked at the 
records, he was admitted a couple of years ago with a sense of "going crazy."  
He had been started on prednisone a day or two before that and it was thought 
to be a reaction to the prednisone.

 

FAMILY HISTORY:  Noncontributory.

 

SOCIAL HISTORY:  He still smokes about 5 cigarettes a day.  He does not drink 
alcohol.

 

REVIEW OF SYSTEMS:  Negative for head trauma, seizures, or strokes.  He has not 
had any episodes of loss of awareness.  He has significant joint problems in 
his knees and hips.  He just returned from [place] and had seen his primary 
care doctor there about a week ago.  The difficulties with his wife and his 14-
year-old dog are described above.  He has not had any nausea or vomiting.  He 
does not feel short of breath. He has no chest pain. There is no other history 
of psychiatric disease. He is very hard of hearing and does not have his 
hearing aids because the battery ran out.

 

PHYSICAL EXAMINATION:  He is well nourished and well hydrated.  He has been 
afebrile, most recent temperature 98.2 temporally.  Blood pressure most 
recently 150/36, but was 208/100 when he came in.  Heart rate is running in the 
60s and seems regular.  Respiratory rate 24 and oxygen saturation is 100% on 
room air. Neck is supple.  Head is atraumatic.  Heart is in a regular rhythm 
without murmurs heard.  Lungs are clear.  Carotid pulses are symmetrical and 
there are no cervical bruits.  Her oral mucosa is moist and atraumatic.

 

Neurological Exam:  Pupils react equally from about 3 to 2 mm.  Eye movements 
are full.  Visual fields are full to confrontation.  Funduscopic exam reveals 
sharp discs with silver wiring.

 

Facial musculature is symmetric.  He has repetitive bursts of facial 
contraction with squinting, puckering of the lower face, and then stuttering.  
It occurs frequently and intensifies as he seems to get more stressed.  It was 
not present at all when he was sleeping and came on as we talked and he became 
more alert.  He would have simultaneous contraction and rigorous-like shaking 
of the shoulders and upper trunk.  There is no shaking or abnormal movements in 
the legs.  Facial sensation is symmetric to light touch.  Palate and tongue 
appear normal and tongue protrudes in the midline.  He stutters and stammers 
when he has episodes of spasms, but not otherwise.  He is extremely hard of 
hearing and mainly hears out of his left ear when I talk very loudly.

 

Motor exam reveals normal tone in the limbs.  When he has the shuttering 
episodes, his arms stiffen.  He has normal strength to command proximally and 
distally in the arms and legs.

 

Sensory exam is appropriate for age.  He has loss of vibration and light touch 
in the toes.  He has normal sensation in the hands.

 

Reflexes are trace in the arms and at the knees, absent at the ankles.  Plantar 
responses are flexor bilaterally.

 

Finger-to-nose maneuver is normal bilaterally in between spasms.  There is no 
asterixis or myoclonus.

 

He is alert and able to provide a pretty good history barring the hearing 
barrier. He is able to provide reasonably good details of the last few days.  
He gets tearful describing his current home situation.

 

DIAGNOSTIC STUDIES/LAB DATA:  Laboratory data includes an MRI of the brain 
interpreted as showing atrophy with chronic ischemic changes.  I reviewed the 
images and I agree.  EEG done earlier today revealed some mild generalized 
slowing. There were no epileptiform discharges including during spasms of 
facial muscles.

 

Other laboratory data notable for a CBC with a hemoglobin of 11.4, hematocrit 34
%, otherwise a normal CBC.  Sedimentation rate is elevated at 51 with upper 
limit of normal being 40.  His INR is within normal limits as his PTT.  
Chemistries notable for a creatinine of 1.75, which is similar to historical 
norms. BUN is 38, which is a bit elevated versus historical norms.  Glucose 198 
this morning and the rest of the chemistry profile was fairly unremarkable.  
Magnesium is a little bit low at 1.7.  Troponin elevated at 0.04, but this is 
similar if not lower than historical norms.  C-reactive protein is elevated at 
17.64.  TSH is normal at 2.5.

 

IMPRESSION AND PLAN:  Impression is that of abrupt onset of dyskinesias, which 
are bilateral and symmetric and not suggestive of a cerebrovascular event.  His 
EEG is normal and there is no alternation in awareness in spite of the 
bilateral motor features, so I do not think it is epileptiform either.  He had 
stopped his medications and so perhaps it is a withdrawal from his Wellbutrin 
or perhaps his alprazolam, but he was not on it very much.  He did have an 
admission for "going crazy" a couple of years ago and he is under a tremendous 
stress and so this is may be psychogenic.

 

I would recommend giving him some clonazepam 0.5 mg just 1 dose to see how he 
responds to that.  Recommend additional blood work to look for evidence of 
vasculitis particularly with his elevated sedimentation rate and CRP.  I 
recommend checking more extensive thyroid functions as well.  Miriam Farr is correcting his magnesium and rechecking a level.  A carotid 
ultrasound is pending, although again I do not think this is cerebrovascular.  
Urinalysis is still pending, but he does not have a fever or an elevated white 
count to suggest an infection.  If he responds to the clonazepam, then I would 
probably give him a standing dose for a little while and if all of his labs 
come back negative, perhaps get a psychiatric opinion as well.

 

I will continue to follow him along with you.

 

 [id number] #: [id number]

CHELSEY

## 2018-05-07 NOTE — RAD
CPT II Codes: 3100F



INDICATION:  Speech disturbance



COMPARISON:  None



TECHNIQUE:  Multiple grey scale, color and doppler tracings of the common, internal and

external carotid and vertebral arteries were obtained.  Stenosis estimations reflect

velocity criteria that have been correlated to angiographic stenosis calculations based on

the distal internal carotid diameter. 



Right carotid:  



There is coarsely calcified atherosclerotic plaque within the right carotid bulb.  The

peak systolic velocity in the proximal right internal carotid artery is 88 cm/s and the

maximum end-diastolic velocity is 13 cm/s.  The peak systolic velocity in the distal

common carotid artery is 78 cm/s and the maximum end-diastolic velocity is 9 cm/s. The

internal to common carotid ratio is 1.13.  This would be consistent with a less than 50%

stenosis.  



Left carotid:  



There is coarsely calcified atherosclerotic plaque within the left carotid bulb.  The peak

systolic velocity in the proximal right internal carotid artery is 75 cm/s and the maximum

end-diastolic velocity is 10 cm/s.  The peak systolic velocity in the distal common

carotid artery is 90 cm/s and the maximum end-diastolic velocity is 12 cm/s. The internal

to common carotid ratio is 0.83.  This would be consistent with a less than 50% stenosis. 





Vertebrals: The left vertebral artery exhibits bidirectional flow. The left subclavian

artery exhibits spectral broadening relative to the right vertebral artery.



IMPRESSION:  

1. There is coarse atherosclerotic calcification at the bilateral carotid bulbs but the

flow velocity and ratios correspond to less than 50% degree stenosis.

2. 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

including deranged left subclavian arterial waveforms and bidirectional flow recorded in

the left vertebral artery. Please correspond to signs and symptoms of subclavian steal

syndrome which can include visual disturbance, dizziness and/or in balance caused by or

exacerbated during utilization of the left upper extremity.

## 2018-05-08 VITALS — SYSTOLIC BLOOD PRESSURE: 150 MMHG | DIASTOLIC BLOOD PRESSURE: 56 MMHG

## 2018-05-08 RX ADMIN — HEPARIN SODIUM SCH UNITS: 5000 INJECTION INTRAVENOUS; SUBCUTANEOUS at 05:16

## 2018-05-08 RX ADMIN — BUPROPION HYDROCHLORIDE SCH MG: 150 TABLET, EXTENDED RELEASE ORAL at 09:58

## 2018-05-08 RX ADMIN — INSULIN LISPRO SCH UNIT: 100 INJECTION, SOLUTION INTRAVENOUS; SUBCUTANEOUS at 13:22

## 2018-05-08 RX ADMIN — HEPARIN SODIUM SCH UNITS: 5000 INJECTION INTRAVENOUS; SUBCUTANEOUS at 13:22

## 2018-05-08 RX ADMIN — INSULIN LISPRO SCH UNIT: 100 INJECTION, SOLUTION INTRAVENOUS; SUBCUTANEOUS at 09:57

## 2018-05-08 NOTE — PN
Subjective


Date of Service: 05/08/18


Interval History: 





Feels better this am.  Spoke with his nurse who reports some slurred speech 

overnight.  The patient was aware of it but felt his mouth was dry.  He has had 

no more tremor like episodes and feels it may have been lack of medication and 

stress.  He did have some bradycardia last night and his blood pressure 

remained elevated, requiring prn medication.  He denies any new issues, 

specifically, no focal weakness or numbness, no speech difficulties this am, no 

gait problems, no confusion.  He is pleasant this am and excited about the 

possibility of going home.  We spoke at length about his wife who is sick but 

getting good care and improving.  He was very upset about his dog yesterday as 

well who seems to be doing better.  





MRI:  No evidence of acute stroke.  Cortical atrophy and chronic white matter 

changes.  Films reviewed and agree with findings


EEG:  generalized slowing, no epileptiform activity


Carotid U/S:  Coarse plaques in bulbs but < 50% stenosis.  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


TTE:  EF 55-60%, No PFO, mild valve disease, no critical stenosis


Cr:  1.75-->1.49


T3:  Low





Objective


Active Medications: 








Alprazolam (Xanax Tab*)  0.5 mg PO BID PRN


   PRN Reason: ANXIETY


Aspirin (Aspirin Ec Tab*)  [facility] DAILY [facility]


Atorvastatin Calcium (Lipitor*)  5 mg PO BEDTIME [facility]


   Last Admin: 05/07/18 20:37 Dose:  5 mg


Bupropion HCl (Wellbutrin Sr Tab*)  150 mg PO BID [facility]


   Last Admin: 05/07/18 20:37 Dose:  150 mg


Clopidogrel Bisulfate (Plavix Tab*)  75 mg PO DAILY [facility]


Device (Tiotropium Inhaler Device*)  1 each INH 0900 ONE


   Stop: 05/08/18 09:01


Dextrose (D50w Syringe 50 Ml*)  12.5 gm IV PUSH .FOR FS < 60 - SS PRN


   PRN Reason: FS < 60


Heparin Sodium (Porcine) (Heparin Vial(*))  5,000 units SUBCUT Q8HR [facility]


   Last Admin: 05/08/18 05:16 Dose:  5,000 units


Insulin Human Lispro (Humalog*)  0 - 5 units SUBCUT AC [facility]


   PRN Reason: Protocol


   Last Admin: 05/07/18 17:32 Dose:  3 unit


Metoprolol Tartrate (Lopressor Iv*)  5 mg IV Q4H PRN


   PRN Reason: Systolic Bp Greater Than: 190


   Last Admin: 05/07/18 20:45 Dose:  5 mg


Nicotine (Nicotine Patch  7 Mg/24 Hr*)  1 patch TRANSDERM DAILY [facility]


Pharmacy Profile Note (Nicotine Patch Removal Note*)  1 note PATCH OFF 2100 [facility]


   Last Admin: 05/07/18 20:27 Dose:  Not Given


Tamsulosin HCl (Flomax Cap*)  0.4 mg PO BEDTIME [facility]


   Last Admin: 05/07/18 20:37 Dose:  0.4 mg


Tiotropium Bromide (Spiriva Cap.Inh*)  1 cap INH DAILY [facility]








 Vital Signs











  05/07/18 05/07/18 05/07/18





  10:00 10:16 10:39


 


Temperature   97.9 F


 


Pulse Rate   60


 


Respiratory 29 22 24





Rate   


 


Blood Pressure  217/98 180/79





(mmHg)   


 


O2 Sat by Pulse   99





Oximetry   














  05/07/18 05/07/18 05/07/18





  10:44 11:00 11:55


 


Temperature  98.7 F 98.2 F


 


Pulse Rate 60 64 56


 


Respiratory 12 20 24





Rate   


 


Blood Pressure 187/78 187/78 193/72





(mmHg)   


 


O2 Sat by Pulse 96 96 100





Oximetry   














  05/07/18 05/07/18 05/07/18





  13:47 16:33 19:51


 


Temperature   97.6 F


 


Pulse Rate 57  57


 


Respiratory  18 22





Rate   


 


Blood Pressure 150/36  197/60





(mmHg)   


 


O2 Sat by Pulse 98  99





Oximetry   














  05/07/18 05/07/18 05/07/18





  19:55 20:00 20:40


 


Temperature   


 


Pulse Rate   64


 


Respiratory 16  





Rate   


 


Blood Pressure   





(mmHg)   


 


O2 Sat by Pulse  99 





Oximetry   














  05/07/18 05/08/18 05/08/18





  23:15 00:44 03:30


 


Temperature 98.0 F  97.6 F


 


Pulse Rate 59  66


 


Respiratory 16  16





Rate   


 


Blood Pressure 181/54  161/65





(mmHg)   


 


O2 Sat by Pulse 98 98 96





Oximetry   











Oxygen Devices in Use Now: None


Neurology Exam: 





General: 





HEENT: Normocephalic/atraumatic, sclera anicteric, mucous membranes dry





Neck: Supple, no bruits





Chest: Clear to auscultation bilaterally 





Cardiovascular: Regular rate and rhythm without murmurs, rubs, gallops





Abdomen: Soft, nontender/nondistended





Extremities: No clubbing, cyanosis, or edema








Neurological Findings: 





Awake, Alert, Oriented x3





Speech: fluent without dysarthria, repetition intact, recall intact





Cranial Nerve: PEERL, EOM intact, VFF, no nystagmus, face symmetric bilaterally

, facial sensation intact, hearing diminished bilaterally, palate elevates 

symmetrically, tongue midline





Motor: 5/5 throughout, proximal and distal extremities x4 tone/bulk normal





Sensation: intact to LT/PP bilaterally upper and lower extremities





Deep Tendon Reflex: Down throughout, equivocal Babinski





Finger to nose, rapid alternating movements intact without tremor








Result Diagrams: 


 05/07/18 07:27





 05/08/18 05:16





Assessment/Plan





89 year old, with history of HTN, Depression, Diabetes, some psychiatric issues 

in the distant past, present with acute onset of paroxysmal tremors which 

appear to be more non-physiologic.  Now apparently resolved.  The patient is 

very aware of these.  He admits to major life stressors including a sick wife 

and sick dog, both of whom he loves and cares for greatly.  He also admits to 

stopping all medication 3-4 days prior to admission including anti-depressant 

and HTN medications.





--His tremors seem to have resolved.  Workup is negative for any possible 

causes.  My suspicion is that these may have been associated with acute 

withdraw from his medication, specifically depression med and/or the stressors 

in his life.  Since restarting his medications, he already feels better.  He 

notes that he will now be very compliant with his medications. 





--His Cr was elevated on admission, now improved.  Myoclonus can develop with 

acute renal failure but these episodes do not sound myoclonic in nature. Doubt 

this is the cause





--T3 was low in setting of T4.  Unclear significance, should follow up with 

PCP.  I do not suspect this is the cause of his abnormal movements.





--Depression appears to be returning to baseline control.  Improved today





--HTN:  Labile.  Adjustment per primary team





--BG:  Adjustments per primary team.





--He would like to go home if possible.  If he remains tremor free, I see no 

reason why he cannot go home, assuming his blood pressures are better 

controlled and blood sugars are controlled.  I defer to Primary team.  He can 

follow up with Neurology as an outpatient.





I will sign off for now,  no further workup needed from Neurology standpoint. 

We remain available for any new or worsening symptoms.  Thank you for the 

opportunity to participate in his care.

## 2018-05-08 NOTE — PN
Subjective


Date of Service: 05/08/18


Interval History: 





Patient seen and examined at bedside. Denies fever, chills, shortness of breath

, chest discomfort, N/V/D. Pt states that the tremors have resolved and his 

speech is back to baseline. He states that he is feeling much better today. 

Discussed with Pt the importance of taking his medications.





Tele: Sinus rhythm, rate 60-80's








Family History: Unchanged from Admission


Social History: Unchanged from Admission


Past Medical History: Unchanged from Admission





Objective


Active Medications: 





Alprazolam (Xanax Tab*)  0.5 mg PO BID PRN Reason: ANXIETY


Aspirin (Aspirin Ec Tab*)  81 mg PO DAILY [facility]


Atorvastatin Calcium (Lipitor*)  5 mg PO BEDTIME [facility]


Bupropion HCl (Wellbutrin Sr Tab*)  150 mg PO BID [facility]


Clopidogrel Bisulfate (Plavix Tab*)  75 mg PO DAILY [facility]


Dextrose (D50w Syringe 50 Ml*)  12.5 gm IV PUSH .FOR FS < 60 - SS PRN Reason: 

FS < 60


Heparin Sodium (Porcine) (Heparin Vial(*))  5,000 units SUBCUT Q8HR [facility]


Insulin Human Lispro (Humalog*)  0 - 5 units SUBCUT AC [facility]


Metoprolol Tartrate (Lopressor Iv*)  5 mg IV Q4H PRN


   PRN Reason: Systolic Bp Greater Than: 190


Metoprolol Tartrate (Lopressor Tab*)  25 mg PO BID [facility]


Nicotine (Nicotine Patch  7 Mg/24 Hr*)  1 patch TRANSDERM DAILY [facility]


Non-Formulary Medication (Losartan/Hydrochlorothiazide [Losartan-Hctz 50-12.5 

Mg Tab])  1 tab PO DAILY [facility]


Pharmacy Profile Note (Nicotine Patch Removal Note*)  1 note PATCH OFF 2100 [facility]


Tamsulosin HCl (Flomax Cap*)  0.4 mg PO BEDTIME [facility]


Tiotropium Bromide (Spiriva Cap.Inh*)  1 cap INH DAILY [facility]





 Vital Signs - 8 hr











  05/08/18 05/08/18 05/08/18





  08:01 09:16 11:48


 


Temperature 98.3 F  98.5 F


 


Pulse Rate 65 91 69


 


Respiratory 16 18 24





Rate   


 


Blood Pressure 155/70  178/76





(mmHg)   


 


O2 Sat by Pulse 97 100 96





Oximetry   











Oxygen Devices in Use Now: None


Appearance: NAD, laying in bed


Ears/Nose/Mouth/Throat: Mucous Membranes Moist


Respiratory: Symmetrical Chest Expansion and Respiratory Effort, Clear to 

Auscultation


Cardiovascular: NL Sounds; No Murmurs; No JVD, RRR


Abdominal: NL Sounds; No Tenderness; No Distention


Extremities: No Edema


Skin: No Rash or Ulcers


Neurological: Alert and Oriented x 3, NL Muscle Strength and Tone


Lines/Tubes/Other Access: Clean, Dry and Intact Peripheral IV - site benign


Nutrition: Taking PO's


Result Diagrams: 


 05/07/18 07:27





 05/08/18 05:16





Assess/Plan/Problems-Billing





Mr. Fuentes is an 89 year old, with past medical history of HTN, Depression, 

Diabetes and some psychiatric issues in the distant past who present with acute 

onset of paroxysmal tremors and difficulty with speech, with major life 

stressors including a sick wife and sick dog, both of whom he loves and cares 

for greatly.  He also admits to stopping all medication 3-4 days prior to 

admission including anti-depressant and HTN medications.











- Patient Problems


(1) Difficulty with speech


Code(s): R47.9 - UNSPECIFIED SPEECH DISTURBANCES   SNOMED Code(s): 543602565


   Comment: 


- Resolved


- With associated tremors that have also resolved


- EEG no signs of seizures


- CT and MRI brain, negative


- Carotid dopplers, bilateral carotid bulbs with < 50% stenosis


- Echo, no significant findings


- Neurology consult, input appreciated. Workup is negative for any possible 

causes.  Neurology feels these are associated with acute withdraw from his 

medication, specifically antidepressants and/or the stressors in his life.    





(2) Elevated troponin


Code(s): R74.8 - ABNORMAL LEVELS OF OTHER SERUM ENZYMES   SNOMED Code(s): 

544896915


   Comment: 


- Denies chest pain


- Troponin 0.05 and 0.04


- Suspect this is Pt's baseline   





(3) CKD (chronic kidney disease) stage 3, GFR 30-59 ml/min


Code(s): N18.3 - CHRONIC KIDNEY DISEASE, STAGE 3 (MODERATE)   SNOMED Code(s): 

692163767


   Comment: 


- Creatinine at baseline   





(4) Hypomagnesemia


Code(s): E83.42 - HYPOMAGNESEMIA   SNOMED Code(s): 615479536


   Comment: 


- Received replacement yesterday


- Will give more replacement today   





(5) HTN (hypertension)


Code(s): I10 - ESSENTIAL (PRIMARY) HYPERTENSION   SNOMED Code(s): 91659758


   Comment: 


- -200's


- Continues to be hypertensive


- Will resume home medications   





(6) HLD (hyperlipidemia)


Code(s): E78.5 - HYPERLIPIDEMIA, UNSPECIFIED   SNOMED Code(s): 98333537


   Comment: 


- Continue atorvastatin   





(7) BPH (benign prostatic hyperplasia)


Code(s): N40.0 - BENIGN PROSTATIC HYPERPLASIA WITHOUT LOWER URINRY TRACT SYMP   

SNOMED Code(s): 733898284


   Comment: 


- With history of prostate ca


- Continue flomax   





(8) CAD (coronary artery disease)


Code(s): I25.10 - ATHSCL HEART DISEASE OF NATIVE CORONARY ARTERY W/O ANG PCTRS 

  SNOMED Code(s): 07742115


   Comment: 


- Asymptomatic


- Continue ASA and plavix   





(9) COPD (chronic obstructive pulmonary disease)


Code(s): J44.9 - CHRONIC OBSTRUCTIVE PULMONARY DISEASE, UNSPECIFIED   SNOMED 

Code(s): 34769026


   Comment: 


- No sign of acute exacerbation


- Continue spiriva    





(10) Depression


Code(s): F32.9 - MAJOR DEPRESSIVE DISORDER, SINGLE EPISODE, UNSPECIFIED   

SNOMED Code(s): 57580603


   Comment: 


- Continue wellbutrin   





(11) Type 2 diabetes mellitus


Comment: 


- Glucose 130-300's


- Continue lispro SSI coverage with meals


- Resume glimepiride at discharge   





(12) DVT prophylaxis


Code(s): ZSB0313 -    SNOMED Code(s): 750735223


   Comment: 


- SQ heparin  and TEDs   





(13) Full code status


Code(s): Z78.9 - OTHER SPECIFIED HEALTH STATUS   SNOMED Code(s): 156580448


   


Status and Disposition: 





OBV. Discharge to home when medically stable, possible later today.

## 2018-05-09 NOTE — DS
CC:  Dr. Hitchcock; Dr. Sheron Bethea in [place], [place]; Dr. Evin Jenkins *

 

DISCHARGE SUMMARY:

 

DATE OF ADMISSION:  05/07/18

 

DATE OF DISCHARGE:  05/08/18

 

PRIMARY CARE PROVIDERS:  Dr. Lori Hitchcock in [place] and Dr. Sheron Bethea in [place], [place]

 

CONSULTATIONS WHILE IN THE HOSPITAL:  Dr. Rex Vyas and Dr. Evin Jenkins
, Neurology



ATTENDING PROVIDER: Marycarmen Newberry MD * (DICTATED BY ANDREI RAMOS NP)

 

PRIMARY DIAGNOSES:

1.  Difficulty with speech, resolved.

2.  Tremors, resolved, suspected to be secondary to withdrawal from 
antidepressants.

3.  Hypertension, improving.

4.  Hypertroponinemia, suspect the patient is at baseline.

5.  Hypomagnesium.

 

SECONDARY DIAGNOSES:

1.  Chronic kidney disease, stage 3.

2.  Hyperlipidemia.

3.  Benign prostatic hypertrophy.

4.  Coronary artery disease.

5.  Chronic obstructive pulmonary disease.

6.  Depression.

7.  Diabetes mellitus type 2.

 

STUDIES WHILE IN THE HOSPITAL:

1.  Chest x-ray from 05/07/18.  Radiologist's impression:  Cardiomegaly, no 
evidence for alveolar consolidation.

2.  Brain CT from 05/07/18.  Radiologist's impression:  No acute intracranial 
process evident.  Involutional change and stigmata of chronic small vessel 
ischemic disease.

3.  Brain MRI from 05/07/18.  Radiologist's impression:  Cortical atrophy with 
chronic microvascular ischemic changes.  No findings of acute ischemia.  Chronic
, small amount bilateral mastoid air cell effusions.

4.  Bilateral carotid Doppler study from 05/07/18.  Radiologist's impression: 
There is coarse atherosclerotic calcification at the bilateral carotid bulbs 
but the flow velocity and ratio correspond to less than 50% decreased stenosis.
  There is evidence of flow-limiting stenosis at the origin of the left 
subclavian artery including deranged left subclavian arterial waveforms and bi-
directional flow recorded in the left vertebral artery.  Please correspond to 
signs and symptoms of a subclavian steal syndrome, which can include visual 
disturbances, dizziness, and/or imbalance caused by or exacerbated during 
utilization of the upper extremity.

5.  Transthoracic echocardiogram on 05/07/18.  Cardiologist's conclusion:  
Global left ventricular wall motion and contractility within normal limits.  
There is normal left ventricular systolic function.  The estimated ejection 
fraction is 55% to 60%.  The right ventricular global systolic function is 
normal.  There were late bubble seen in the left atrium, not consistent with 
PFO.  There is evidence of aortic sclerosis without stenosis.  There is mild 
mitral regurgitation, trace tricuspid regurgitation.  Unable to estimate the 
right ventricular systolic pressure.

6.  Electroencephalogram on 05/07/18.  Neurologist's clinical impression:  
Abnormal EEG due to generalized slowing of the background rhythms consistent 
with diffuse cerebral dysfunction.  There is no focal epileptiform features to 
this recording.

 

DISCHARGE MEDICATIONS:  Continued home medications:

1.  Oxycodone 5/325 one tablet oral daily as needed for pain.

2.  Spiriva 1 capsule inhalation daily.

3.  Tamsulosin 0.4 mg oral daily at bedtime.

4.  Nitroglycerin 0.4 mg sublingual every 5 minutes as needed for chest pain.

5.  Neomycin-polymyxin 4 drops to ear 3 times daily as needed.

6.  Metoprolol tartrate 25 mg oral twice daily.

7.  Losartan/hydrochlorothiazide 50-12.5 one tablet oral daily.

8.  Glimepiride 2 mg oral daily.

9.  Fish oil 1 capsule oral daily.

10.  Plavix 75 mg oral daily.

11.  Cinnamon bark 500 mg oral daily.

12.  Biotin 10,000 mcg oral daily.

13.  Atorvastatin 5 mg oral daily at bedtime.

14.  Aspirin 81 mg oral daily.

15.  Xanax 0.5 mg oral twice daily as needed for anxiety.

 

HISTORY OF PRESENT ILLNESS/HOSPITAL COURSE:  Mr. Fuentes is an 89-year-old 
male with past medical history significant for coronary artery disease, 
peripheral vascular disease, diabetes mellitus, prostate cancer, status post 
radiation, hypertension, COPD, pulmonary fibrosis, who had been in his usual 
state of health when he went to bed the night prior.  The patient states that 5 
or 6 days prior, he had been in [place], seen by his primary care provider, and 
then traveled to [place] where he spent the summer.  The patient's wife went 
into the hospital 2 days prior to his presentation.  He denied any fevers, 
chills, shortness of breath, nausea, vomiting, diarrhea.  The patient states 
that he woke up at approximately 3 a.m. and noticed whole body tremors and was 
having trouble speaking, lasted for approximately an hour and half and he 
called EMS.  The patient also stated he had not been taking his home 
medications accordingly since his wife had been in the hospital.  He states 
when he takes his medications accordingly, his diabetes and blood pressure are 
well controlled.  When EMS arrived, the patient was found to have a stuttering 
speech during his tremors.  His blood pressure was 190/110.  He was brought to 
the emergency room for further evaluation.

 

While in the emergency room, the patient continued to be hypertensive with 
systolic blood pressures in the 200s.  He had a brain CT showing no acute 
findings.  Chest x- ray showing cardiomegaly, no acute findings.  EKG without 
significant findings.  He had an EEG without signs of seizure activity and the 
patient had a telestroke consult, was deemed to not be a tPA candidate due to 
his symptoms being vague and no exact window and the hospitalists were asked to 
evaluate the patient for admission.

 

While in the hospital, the patient was seen in consultation by Dr. Vyas.  
The patient had a brain MRI with no acute findings of a CVA.  He had a 
transthoracic echocardiogram showing no acute findings.  He had carotid Doppler 
showing no significant findings, but he does have bilateral carotid bulb 
stenosis less than 50%.  His speech had improved during his stay.  He was then 
seen in consultation by Dr. Jenkins, who felt that the patient's symptoms which 
had resolved by the morning were likely associated with acute withdrawal from 
his medications, specifically his antidepressants and/or stress in his life.  
The patient was noted to have hypomagnesium and received replacement.  He had 
an elevated troponin that appeared to be at his baseline, no further workup was 
completed.  It was felt that he was ready for discharge today.

 

Mr. Fuentes is stable for discharge home today.  Vital signs are as follows: 
Temperature 98.7, heart rate 50, respiratory rate 16, O2 sat 98% on room air, 
blood pressure 150/56.

 

DISCHARGE PLAN:  Mr. Fuentes will be discharged to home.

 

ACTIVITY:  As tolerated.

 

DIET:  He should be on a heart-healthy diet.

 

He will be resumed on all of his other usual medications.  At this point, his 
difficulty with speech and tremors have resolved spontaneously after being 
restarted on his home medications.  Please continue to encourage the patient to 
take his medications.  At his followup appointment if at all possible,  please 
try to consolidate any twice daily medications down to once daily as I feel he 
would likely be more compliant with taking them.  The patient has been asked to 
return to the emergency room for any chest pain, shortness of breath, or signs 
of a stroke. He has a followup appointment with his primary care provider here 
in [place], Dr. Lori Hitchcock, on 05/17/18 at 12 p.m.

 

This is a summarized report of a complex medical history and hospital stay.  
For further details, please see the entire medical record.

 

TIME SPENT:  Time for this discharge was approximately 50 minutes, greater than 
half of that was spent with the patient discussing discharge plans and 
instructions.

 

CONDITION ON DISCHARGE:  Stable.

 

____________________________________ ANDREI ROSARIO, DANIELE

 

[id number]/CPS #: [id number]

CHELSEY

## 2018-07-17 ENCOUNTER — HOSPITAL ENCOUNTER (EMERGENCY)
Dept: HOSPITAL 25 - ED | Age: 83
LOS: 1 days | Discharge: HOME | End: 2018-07-18
Payer: MEDICARE

## 2018-07-17 DIAGNOSIS — E11.9: ICD-10-CM
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
## 2018-07-17 DIAGNOSIS — Z85.46: ICD-10-CM
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
## 2018-07-17 DIAGNOSIS — M85.88: ICD-10-CM

## 2018-07-17 DIAGNOSIS — S16.1XXA: Primary | ICD-10-CM

## 2018-07-17 DIAGNOSIS — F17.210: ICD-10-CM

## 2018-07-17 DIAGNOSIS — Z95.5: ICD-10-CM
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
## 2018-07-17 DIAGNOSIS — M50.30: ICD-10-CM

## 2018-07-17 DIAGNOSIS — Z88.8: ICD-10-CM

## 2018-07-17 DIAGNOSIS — I10: ICD-10-CM

## 2018-07-17 DIAGNOSIS — Z82.0: ICD-10-CM

## 2018-07-17 DIAGNOSIS — Z90.49: ICD-10-CM

## 2018-07-17 DIAGNOSIS — Z82.49: ICD-10-CM

## 2018-07-17 DIAGNOSIS — I25.119: ICD-10-CM

## 2018-07-17 DIAGNOSIS — J44.9: ICD-10-CM

## 2018-07-17 PROCEDURE — 72125 CT NECK SPINE W/O DYE: CPT

## 2018-07-17 PROCEDURE — 99283 EMERGENCY DEPT VISIT LOW MDM: CPT

## 2018-07-17 NOTE — XMS REPORT
Servando Fuentes

 Created on:July 10, 2018



Patient:Servando Fuentes

Sex:Male

:1928

External Reference #:[id number]





Demographics







 Address  [address] [place] [address]

 

 Home Phone  [phone]

 

 Work Phone  [phone]

 

 Preferred Language  English

 

 Marital Status  Not  Or 

 

 Mu-ism Affiliation  Unknown

 

 Race  White

 

 Ethnic Group  Not  Or 









Author







 Organization  [facility]

 

 Address  [address] [place] [address]

 

 Phone  [phone]









Support







 Name  Relationship  Address  Phone

 

 Tonie Fuentes  Wife  Unavailable  Unavailable









Care Team Providers







 Name  Role  Phone

 

 Lori Hitchcock M.D.  Primary Care Physician  Unavailable









Payers







 Type  Date  Identification Numbers  Payment Provider  Subscriber

 

 Medicare Primary  Effective:  Policy Number:  Medicare - NGS  Servando GEE



   1993  [id number]    Alfredo









 PayID: [id number]  [address]









 [place] [address]









 [place]    Policy Number: [id number]  [facility]  Servando Fuentes









 PayID: [id number]  [address]









 [place] [address]







Problems







 Date  Description  Provider  Status

 

 Onset:   Hearing loss    Active

 

 Onset:   Pulmonary emphysema    Active

 

 Onset:   Thyroid dysfunction    Active

 

 Onset:   History of malignant neoplasm of    Active



   bladder    

 

 Onset:   Essential hypertension    Active

 

 Onset:   Depressive disorder    Active

 

 Onset:   Type 2 diabetes mellitus    Active

 

 Onset:   Malignant melanoma    Active

 

 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  
Active



   degeneration    

 

 Onset: 2016  Senile ectropion  Sharif Mason M.D.  Active

 

 Onset: 2016  Presence of intraocular lens  Sharif Mason M.D.  Active







Family History







 Date  Family Member(s)  Problem(s)  Comments

 

   Father  No Current Problems  

 

   Mother  Macular Degeneration  







Social History







 Type  Date  Description  Comments

 

 Marital Status    Legal Status:   

 

 Occupation      

 

 Work Status    Retired  

 

 ETOH Use    Denies alcohol use  

 

 Smoking    Patient is a former smoker  

 

 Recreational Drug Use    Denies Drug Use  

 

 Daily Caffeine    Consumes on average 1 cup of regular coffee per  



     day  







Allergies, Adverse Reactions, Alerts







 Date  Description  Reaction  Status  Severity  Comments

 

 2016  NKDA    active    







Medications







 Medication  Date  Status  Form  Strength  Qnty  SIG  Indications  Ordering



                 Provider

 

 Atorvastatin    Active  Tablets  40mg        Unknown



 Calcium  000              

 

 Losartan    Active  Tablets  100mg        Unknown



 Potassium  000              

 

 Metoprolol    Active  Tablets  25mg        Unknown



 Tartrate  000              

 

 Aspirin    Active  Tablets DR  81mg        Unknown



   000              

 

 Glimepiride    Active  Tablets  1mg        Unknown



   000              

 

 Tamsulosin HCL    Active  Capsules  0.4mg        Unknown



   000              

 

 Clopidogrel    Active  Tablets  75mg        Unknown



 Bisulfate  000              

 

 Spiriva    Active  Capsules  18mcg        Unknown



 Handihaler  000              

 

 Cinnamon Bark    Active  Powder          Unknown



   000              

 

 Co-Enzyme Q10    Active  Capsules  100mg        Unknown



   000              

 

 Fish Oil    Active  Capsules  1000mg    1 by    Unknown



   000          mouth    



             every    



             day    

 

                 

 

 Amlodipine    Hx  Tablets  10mg        Unknown



 Besylate  000 -              



                 



   017              

 

 Nicotine    Hx  Patches  21mg/24HR        Unknown



   000 -    24HR          



                 



   018              

 

 Nitroglycerin    Hx    0.4mg SL        Unknown



   000 -              



                 



   018              

 

 Alprazolam    Hx  Tablets  0.25mg        Unknown



   000 -              



                 



   018              

 

 Bupropion HCL ER    Hx  Tablets ER  150mg        Unknown



 (SR)  000 -    12HR          



                 



   018              

 

 Darifenacin    Hx  Tablets ER  7.5mg        Unknown



 Hydrobromide ER  000 -    24HR          



                 



   018              

 

 Lutein    Hx  Capsules  20mg        Sharif Mason,



                 M.D.



   018              







Results







 Description

 

 No Information







Procedures







 Date  CPT Code  Description  Status

 

 2017  43482  Scanning Computerized Opthalmic Diagnostic Posterior  
Completed



     Seg Retina  

 

 2017  42920  Est Patient Comprehensive Exam  Completed

 

 2016  94059  Scanning Computerized Opthalmic Diagnostic Posterior  
Completed



     Seg Retina  

 

 2016  50417  Est Patient Comprehensive Exam  Completed

 

 2014  80362  Scanning Computerized Opthalmic Diagnostic Posterior  
Completed



     Seg Retina  

 

 2014  53005  Est Patient Comprehensive Exam  Completed

 

 10/17/2013  97724  Fundus Photography With Interpretation And Report  Completed

 

 10/17/2013  97053  Determination Of Refractive State  Completed

 

 10/17/2013  84796  Est Patient Intermediate Exam  Completed

 

 2013  33794  Est Patient Comprehensive Exam  Completed

 

 2013  91381  Scanning Computerized Opthalmic Diagnostic Posterior  
Completed



     Seg Retina  

 

 10/16/2012  45012  Scanning Computerized Opthalmic Diagnostic Posterior  
Completed



     Seg Retina  

 

 10/16/2012  51615  Determination Of Refractive State  Completed

 

 10/16/2012  57357  Est Patient Comprehensive Exam  Completed

 

 10/04/2012  15119  Patient No Show For Appt  Completed

 

 10/04/2011  88614  Est Patient Comprehensive Exam  Completed

 

 2011  28456  Est Patient Intermediate Exam  Completed

 

 2010  10530  Est Patient Comprehensive Exam  Completed

 

 2010  24761  Determination Of Refractive State  Completed

 

 10/09/2009  90512  Determination Of Refractive State  Completed

 

 10/09/2009  97012  Est Patient Comprehensive Exam  Completed

 

 10/13/2008  96744  Determination Of Refractive State  Completed

 

 10/13/2008  35161  Est Patient Comprehensive Exam  Completed

 

 2007  71877  Determination Of Refractive State  Completed

 

 2007  52188  Est Patient Comprehensive Exam  Completed

 

 2006  46851  Determination Of Refractive State  Completed

 

 2006  30250  Est Patient Intermediate Exam  Completed

 

 10/13/2005  07974  Est Patient Comprehensive Exam  Completed

 

 06/15/2005  80973  Extracapsular Cataract Extraction W/Intraocular Lens  
Completed

 

 2005  46662  Ophthalmic Biometry  Completed

 

 2005  71465  Extracapsular Cataract Extraction W/Intraocular Lens  
Completed

 

 2005  98065  Unlisted Procedure, Ophthalmological  Completed

 

 2005  01803  Ophthalmic Biometry  Completed

 

 2005  14907  Fundus Photography With Interpretation And Report  Completed

 

 2005  97247  Est Patient Comprehensive Exam  Completed

 

 2005  500  Systane Eye Drops 15 ML  Completed







Encounters







 Type  Date  Location  Provider  CPT E/M  Dx

 

 Office Visit  2005 12:30p  Sharif Mason MD,  Sharif Mason,  [address]  
[phone]



     portia GARAY    









 [phone]







Plan of Care

07/10/2018 - Sharif Mason M.D.[id number] Nexdtve age-rel mclr degn, l eye, 
adv atrpc with sbfvl invlComments:Smoking can increase the risk of developing 
or worsening any eye related disease, as well as affect your overall health.  
If you are a smoker, we strongly recommend that you quit.If you are not a smoker
, we strongly recommend that you do not start.  You have Macular Degeneration. 
Check your Amsler Grid, with each eye separately, and take the AREDS II formula 
vitamins. If you notice any changes in your vision, please call the office and 
schedule an appointment to see any of the doctors here.Follow up:1 Year Follow 
Up Diagnostic Refraction OCT MAC  You can expect to have your eyes dilated at 
your next visit. If Dr. Mason orders any additional testing, it may require 
extra time. We recommend that youbring sunglasses, as dilation drops often make 
you light sensitive until they wear off. We always recommend you bring someone 
to drive you home if you are uncomfortable driving with your eyes dilated. If 
you have any questions before your next visit, feel free to call our office at [phone]
[phone].[phone] Nexdtve age-related mclr degn, right eye, early dry 
stageComments:You have Macular Degeneration. Check your Amsler Grid, with each 
eye separately, and take the AREDS II formula vitamins. If you notice any 
changes in your vision, please call the office and schedule anappointment to 
see any of the doctors here.E11.9 Type 2 diabetes mellitus without 
complicationsComments:You have diabetes. I do not detect any changes in both of 
your retinas from diabetes at this time. Proper control of your diabetes is 
important for the health of your eyes. Changes in your eyes from diabetes can 
happen without symptoms, so it is important that you have your eyes examined. 
Dr. Mason has sent a report to your primary care doctor, letting them know 
there is no damage from the Diabetes in your eyes.Z96.1 Presence of intraocular 
lensComments:The artificial lens implants in both eyes appear to be stable at 
this time.

## 2018-07-18 VITALS — SYSTOLIC BLOOD PRESSURE: 225 MMHG | DIASTOLIC BLOOD PRESSURE: 85 MMHG

## 2018-07-18 NOTE — ED
Complex/Multi-Sys Presentation





- HPI Summary


HPI Summary: 


This is lucianoe Scott Black documenting for attending Dr. Komal Hernandez MD. 








A 88 y/o male BIBA presents to ED c/o pain from the chest up through his head 

including neck. Currently, the patient has pain in his neck and throat, however 

has no chest pain, "It comes and goes like ingestion". According to the patient

, the pain is horrible and doesn't know what happened. Last Thursday (6 days ago

, 7/12/2018), the patient got cold and got out of bed to grab a blanket. In an 

attempt to grab the blanket, he accidentally slipped ("My feet when under") and 

fell down on his left side. He is not sure why he fell. He noted that the fall 

affected his left ear slightly. Post-accident, he felt fine was able to get up, 

get the blanket and went to sleep, however, at 0600 the pain started to really 

hurt. He went to his Chiropractor Friday, 7/13/2018, in which he received a 

"work-over". On Saturday, 7/14/2018, the pain did not subside and he could not 

go to sleep. His doctor instructed to put ice and heat packs, however still the 

pain worsened. He decided to come in to the ED today because of the constant 

pain. In the ED room, the patient has a pulse of 62, O2 saturation of 98% and 

blood pressure of 181/78. He noted that the pain in the back of his neck has 

not been increasing but constant. When he puts the ice/cold packs on the areas 

of pain, it would go away momentarily. Pt denies any numbness or tingling, 

however has vision changes. 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 SHx of lives 

with wife in his own home. PMHx of stents (3), prostate surgery, AAA, surgery 

on leg. FHx of MI (father at 71 y/o and heavy smoker). Currently on blood 

thinners. 








- History Of Current Complaint


Chief Complaint: EDGeneral


Time Seen by Provider: 07/17/18 23:09


Hx Obtained From: Patient


Onset/Duration: Sudden Onset, Still Present


Timing: Constant - Neck pain, Intermittent, Lasting: - Chest pain


Severity Currently: Moderate


Severity Initially: Moderate


Location: Pain At: - posterior neck


Character: Dull


Aggravating Factor(s): NOTHING


Alleviating Factor(s): HEAT/ICE PACKS TEMPORARILY


Associated Signs And Symptoms: Positive: Chest Pain - resolved, "indigestion", 

Other - vision changes resolved





- Allergies/Home Medications


Allergies/Adverse Reactions: 


 Allergies











Allergy/AdvReac Type Severity Reaction Status Date / Time


 


prednisone Allergy Intermediate Altered Verified 05/07/18 09:44





   Mental  





   Status  


 


zolpidem Allergy  Unknown Verified 05/07/18 11:12





   Reaction  





   Details  














PMH/Surg Hx/FS Hx/Imm Hx


Previously Healthy: No


Endocrine/Hematology History: Reports: Hx Diabetes


Cardiovascular History: Reports: Hx Aneurysm - AAA, Hx Angina, Hx Coronary 

Artery Disease - STENT, Hx Hypertension, Other Cardiovascular Problems/

Disorders - CAD, IDDM II


   Denies: Hx Congestive Heart Failure, Hx Pacemaker/ICD


Respiratory History: Reports: Hx Chronic Obstructive Pulmonary Disease (COPD)


 History: 


   Denies: Hx Dialysis, Hx Renal Disease


Sensory History: Reports: Hx Contacts or Glasses, Hx Hearing Aid, Hx Hearing 

Problem


Opthamlomology History: Reports: Hx Contacts or Glasses


Psychiatric History: 


   Denies: Hx Panic Disorder





- Cancer History


Cancer Type, Location and Year: prostate





- Surgical History


Surgery Procedure, Year, and Place: STENTS IN HEART, STENTS IN LEG, AAA REPAIR 

WITH STENT, INJECTIONS IN EYES, TONSILS, GALLBLADDER, PROSTATE





- Immunization History


Date of Tetanus Vaccine: UTD


Date of Influenza Vaccine: UTD


Infectious Disease History: No


Infectious Disease History: 


   Denies: Traveled Outside the [place] in Last 30 Days





- Family History


Known Family History: Positive: Cardiac Disease - in father, Seizure Disorder - 

in father





- Social History


Lives: With Family


Alcohol Use: Rare


Substance Use Type: Reports: None


Hx Tobacco Use: Yes


Smoking Status (MU): Current Every Day Smoker


Type: Cigarettes


Amount Used/How Often: 12- 14 cigarettes a day


Length of Time of Smoking/Using Tobacco: 70 years


Have You Smoked in the Last Year: Yes





Review of Systems


Negative: Fever


Positive: Chest Pain - resolved , like indigestion 


Respiratory: Negative


Gastrointestinal: Negative


Positive: Other - POSITIVE: neck pain


Skin: Negative


Neurological: Other - NEGATIVE: numbess or tingling


Psychological: Normal


All Other Systems Reviewed And Are Negative: Yes





Physical Exam





- Summary


Physical Exam Summary: 


Appearance: Well-appearing, moderate pain distress, well-nourished


Skin: Warm, color reflects adequate perfusion, dry, scattered ecchymoses 


Head: Normal Head/Face inspection, atraumatic


Eyes: Conjunctiva clear


ENT: Normal inspection


Neck: Supple, no nodes, no JVD, tender posterior spines, decreased side to side 

movement 


Respiratory: Lungs clear, normal breath sounds, no respiratory distress


Cardio: RRR, No murmur, pulses normal, brisk capillary refill


Abdomen: Soft, nontender


Bowel sounds: Present 


Musculoskeletal: Strength Intact/ROM intact, no calf tenderness, 2+ edema right 

foot, 1+ left foot, no rib tenderness, pelvis stable, 


Psychological: Normal


Neuro: Alert, muscle tone normal, no focal deficit











Triage Information Reviewed: Yes


Vital Signs On Initial Exam: 


 Initial Vitals











Temp Pulse Resp BP Pulse Ox


 


 98.3 F   66   16   162/68   99 


 


 07/17/18 22:18  07/17/18 22:18  07/17/18 22:18  07/17/18 22:18  07/17/18 22:18











Vital Signs Reviewed: Yes





Diagnostics





- Vital Signs


 Vital Signs











  Temp Pulse Resp BP Pulse Ox


 


 07/17/18 22:25   63   199/80  98


 


 07/17/18 22:24   63    97


 


 07/17/18 22:18  98.3 F  66  16  162/68  99














- Laboratory


Lab Statement: Any lab studies that have been ordered have been reviewed, and 

results considered in the medical decision making process.





- CT


  ** CERVICAL SPINE CT


CT Interpretation Completed By: Radiologist - 1. No evidence of an acute 

fracture involving the cervical verteb ral bodies or posterior elements.  2. 

Slight flexion deformity centered at the C5-C6 disc space secondary to 

degenerative changes of the disc space and facet joints. 3. Slight 

anterolisthesis of C4 on C5 secondary to degenerative changes of the of the 

facet joints and disc space.  4. Prominent asymmetric spur arising from the C6 

vertebral body extending superiorly in the subarticular space on the left side. 

This causes asymmetric narrowing of the spinal canal. This causes moderate 

asymmetric narrowing of the spinal canal.  5. Multilevel degenerative cervical 

disc disease and facet disease. No significant central canal stenosis. Variable 

degrees of neural foraminal narrowing secondary to degenerative changes of the 

uncovertebral joints and facet joints. ED PHYSICIAN REVIEWED THIS RADIOLOGY 

REPORT.





Re-Evaluation





- Re-Evaluation


  ** First Eval


Re-Evaluation Time: 02:30


Change: Unchanged


Comment: Pt is frustrated waiting for CT result, wants to go home. Denies chest 

pain.  Ambulatory to the BR unassisted.





Complex Multi-Symp Course/Dx


Course Of Treatment: 88 yo M who lives independently with his wife, c/o neck 

pain s/p fall several days ago, and s/p chiropractor manipulation on 7/13/18.  

CT Cervical spine shows multilevel degenerative changes but no fracture.  Pt is 

dc'd home.





- Diagnoses


Provider Diagnoses: 


 Cervical strain, acute








Discharge





- Sign-Out/Discharge


Documenting (check all that apply): Patient Departure - home





- Discharge Plan


Condition: Stable


Disposition: HOME


Patient Education Materials:  Cervical Strain (ED)


Referrals: 


Lori Hitchcock MD [Medical Doctor] - 2 Days


Additional Instructions: 


The CT scan of your cervical spine showed alot of arthritis, but it did not 

show a fracture.


You may take acetaminophen for pain.


You were given acetaminophen 650mg orally at 0250am.


Return to the ER if you have new or worsening symptoms.  





- Billing Disposition and Condition


Condition: STABLE


Disposition: Home

## 2018-07-18 NOTE — RAD
HISTORY: chiropracter adjusted his neck,can't move his neck



COMPARISONS: None



TECHNIQUE: Multiple contiguous axial CT scans were obtained of the cervical spine without

intravenous contrast, with coronal and sagittal multiplanar reformations.



FINDINGS:



BRAIN: The visualized brain is unremarkable

CENTRAL CANAL: Evaluation of the central canal is limited on CT technique; however, there

is no obvious canalicular mass or epidural hemorrhage.



ALIGNMENT: There is straightening of the cervical lordosis. There is trace anterolisthesis

of C4 on C5.

VERTEBRAL BODIES: There is diffuse osteopenia. Is multilevel bridging anterolateral

marginal osteophyte formation. There is fusion of C6 and C7. Conception noted is a

persistent subdental synchondrosis.

JOINTS: There is diffuse facet and uncovertebral osteoarthritis. There is fusion of the

facet joints of C2 and C3. There is advanced osteoarthritis of the atlantoaxial

articulation. There is minimal pannus formation at the atlantoaxial articulation with

associated calcification.

MUSCULATURE: Unremarkable

INTERVERTEBRAL DISCS: There is diffuse loss of intervertebral disc height.



AXIAL IMAGES:

C2-C3: There is no osseous neural foraminal narrowing or central canal stenosis.

C3-C4: There is exuberant bilateral facet hypertrophy. There is uncovertebral hypertrophy.

There is severe bilateral neural foraminal narrowing. There is no osseous central canal

stenosis.

C4-C5: There is bilateral uncovertebral and facet hypertrophy. There is severe bilateral

neuroforaminal narrowing. There is no osseous central canal stenosis.

C5-C6: There is a left lateral recess posterior osteophyte versus calcified disc

protrusion. There is bilateral vertebral and facet hypertrophy. There is severe left and

moderate right neural foraminal narrowing. There is partial effacement of the left lateral

recess.

C6-C7: There is bilateral uncovertebral and facet hypertrophy. There is mild bilateral

neural foraminal narrowing. There is no significant central canal stenosis.

C7-T1: There is mild bilateral neuroforaminal narrowing. There is no osseous central canal

stenosis.



SOFT TISSUES: The prevertebral fat stripe is preserved. There is calcification of the

carotid bifurcations and of the vertebral arteries and visualized thoracic aorta.



OTHER: None.



IMPRESSION: 

OSTEOPENIA.

DEGENERATIVE DISC DISEASE AND OSTEOARTHRITIS.

THERE IS PARTIAL EFFACEMENT OF THE LEFT LATERAL RECESS AT C5-C6.

THERE IS MULTILEVEL NEURAL FORAMINAL NARROWING AS DESCRIBED ABOVE.

NO ACUTE OSSEOUS INJURY TO THE CERVICAL SPINE.

ATHEROSCLEROSIS.



R0

## 2018-11-30 ENCOUNTER — HOSPITAL ENCOUNTER (INPATIENT)
Dept: HOSPITAL 25 - ED | Age: 83
LOS: 6 days | Discharge: HOME HEALTH | DRG: 291 | End: 2018-12-06
Attending: INTERNAL MEDICINE | Admitting: HOSPITALIST
Payer: MEDICARE

## 2018-11-30 DIAGNOSIS — Z79.02: ICD-10-CM

## 2018-11-30 DIAGNOSIS — E46: ICD-10-CM

## 2018-11-30 DIAGNOSIS — N17.9: ICD-10-CM

## 2018-11-30 DIAGNOSIS — Z79.82: ICD-10-CM

## 2018-11-30 DIAGNOSIS — F17.210: ICD-10-CM

## 2018-11-30 DIAGNOSIS — Z79.84: ICD-10-CM

## 2018-11-30 DIAGNOSIS — I13.0: Primary | ICD-10-CM

## 2018-11-30 DIAGNOSIS — Z99.81: ICD-10-CM

## 2018-11-30 DIAGNOSIS — N40.0: ICD-10-CM

## 2018-11-30 DIAGNOSIS — Z66: ICD-10-CM

## 2018-11-30 DIAGNOSIS — N20.0: ICD-10-CM

## 2018-11-30 DIAGNOSIS — I24.8: ICD-10-CM

## 2018-11-30 DIAGNOSIS — N18.9: ICD-10-CM

## 2018-11-30 DIAGNOSIS — I45.10: ICD-10-CM

## 2018-11-30 DIAGNOSIS — I50.43: ICD-10-CM

## 2018-11-30 DIAGNOSIS — E11.22: ICD-10-CM

## 2018-11-30 DIAGNOSIS — I25.10: ICD-10-CM

## 2018-11-30 DIAGNOSIS — Z95.5: ICD-10-CM

## 2018-11-30 DIAGNOSIS — J44.9: ICD-10-CM

## 2018-11-30 LAB
BASOPHILS # BLD AUTO: 0.1 10^3/UL (ref 0–0.2)
EOSINOPHIL # BLD AUTO: 0.1 10^3/UL (ref 0–0.6)
HCT VFR BLD AUTO: 30 % (ref 42–52)
HGB BLD-MCNC: 10 G/DL (ref 14–18)
INR PPP/BLD: 0.95 (ref 0.77–1.02)
LYMPHOCYTES # BLD AUTO: 0.6 10^3/UL (ref 1–4.8)
MCH RBC QN AUTO: 33 PG (ref 27–31)
MCHC RBC AUTO-ENTMCNC: 34 G/DL (ref 31–36)
MCV RBC AUTO: 97 FL (ref 80–94)
MONOCYTES # BLD AUTO: 0.6 10^3/UL (ref 0–0.8)
NEUTROPHILS # BLD AUTO: 7.5 10^3/UL (ref 1.5–7.7)
NRBC # BLD AUTO: 0 10^3/UL
NRBC BLD QL AUTO: 0
PLATELET # BLD AUTO: 148 10^3/UL (ref 150–450)
RBC # BLD AUTO: 3.06 10^6/UL (ref 4–5.4)
WBC # BLD AUTO: 8.9 10^3/UL (ref 3.5–10.8)

## 2018-11-30 PROCEDURE — A9540 TC99M MAA: HCPCS

## 2018-11-30 PROCEDURE — A9558 XE133 XENON 10MCI: HCPCS

## 2018-11-30 PROCEDURE — 94640 AIRWAY INHALATION TREATMENT: CPT

## 2018-11-30 PROCEDURE — 82570 ASSAY OF URINE CREATININE: CPT

## 2018-11-30 PROCEDURE — 71046 X-RAY EXAM CHEST 2 VIEWS: CPT

## 2018-11-30 PROCEDURE — 85027 COMPLETE CBC AUTOMATED: CPT

## 2018-11-30 PROCEDURE — 99283 EMERGENCY DEPT VISIT LOW MDM: CPT

## 2018-11-30 PROCEDURE — 85379 FIBRIN DEGRADATION QUANT: CPT

## 2018-11-30 PROCEDURE — 83880 ASSAY OF NATRIURETIC PEPTIDE: CPT

## 2018-11-30 PROCEDURE — 84540 ASSAY OF URINE/UREA-N: CPT

## 2018-11-30 PROCEDURE — 80053 COMPREHEN METABOLIC PANEL: CPT

## 2018-11-30 PROCEDURE — 85730 THROMBOPLASTIN TIME PARTIAL: CPT

## 2018-11-30 PROCEDURE — 93306 TTE W/DOPPLER COMPLETE: CPT

## 2018-11-30 PROCEDURE — 84443 ASSAY THYROID STIM HORMONE: CPT

## 2018-11-30 PROCEDURE — 84484 ASSAY OF TROPONIN QUANT: CPT

## 2018-11-30 PROCEDURE — 78582 LUNG VENTILAT&PERFUS IMAGING: CPT

## 2018-11-30 PROCEDURE — 85610 PROTHROMBIN TIME: CPT

## 2018-11-30 PROCEDURE — 76775 US EXAM ABDO BACK WALL LIM: CPT

## 2018-11-30 PROCEDURE — 83735 ASSAY OF MAGNESIUM: CPT

## 2018-11-30 PROCEDURE — 82306 VITAMIN D 25 HYDROXY: CPT

## 2018-11-30 PROCEDURE — 86140 C-REACTIVE PROTEIN: CPT

## 2018-11-30 PROCEDURE — 82553 CREATINE MB FRACTION: CPT

## 2018-11-30 PROCEDURE — 85025 COMPLETE CBC W/AUTO DIFF WBC: CPT

## 2018-11-30 PROCEDURE — 84100 ASSAY OF PHOSPHORUS: CPT

## 2018-11-30 PROCEDURE — 83970 ASSAY OF PARATHORMONE: CPT

## 2018-11-30 PROCEDURE — 36415 COLL VENOUS BLD VENIPUNCTURE: CPT

## 2018-11-30 PROCEDURE — 85060 BLOOD SMEAR INTERPRETATION: CPT

## 2018-11-30 PROCEDURE — 83605 ASSAY OF LACTIC ACID: CPT

## 2018-11-30 PROCEDURE — 93005 ELECTROCARDIOGRAM TRACING: CPT

## 2018-11-30 RX ADMIN — FUROSEMIDE SCH MG: 10 INJECTION, SOLUTION INTRAMUSCULAR; INTRAVENOUS at 20:26

## 2018-11-30 RX ADMIN — METOPROLOL SUCCINATE SCH MG: 25 TABLET, EXTENDED RELEASE ORAL at 20:27

## 2018-11-30 RX ADMIN — INSULIN LISPRO SCH UNIT: 100 INJECTION, SOLUTION INTRAVENOUS; SUBCUTANEOUS at 21:55

## 2018-11-30 RX ADMIN — TAMSULOSIN HYDROCHLORIDE SCH MG: 0.4 CAPSULE ORAL at 20:27

## 2018-12-01 LAB
BASOPHILS # BLD AUTO: 0.1 10^3/UL (ref 0–0.2)
EOSINOPHIL # BLD AUTO: 0.2 10^3/UL (ref 0–0.6)
HCT VFR BLD AUTO: 28 % (ref 42–52)
HGB BLD-MCNC: 9.7 G/DL (ref 14–18)
INR PPP/BLD: 0.94 (ref 0.77–1.02)
LYMPHOCYTES # BLD AUTO: 0.8 10^3/UL (ref 1–4.8)
MCH RBC QN AUTO: 34 PG (ref 27–31)
MCHC RBC AUTO-ENTMCNC: 35 G/DL (ref 31–36)
MCV RBC AUTO: 97 FL (ref 80–94)
MONOCYTES # BLD AUTO: 0.7 10^3/UL (ref 0–0.8)
NEUTROPHILS # BLD AUTO: 6.3 10^3/UL (ref 1.5–7.7)
NRBC # BLD AUTO: 0 10^3/UL
NRBC BLD QL AUTO: 0
PLATELET # BLD AUTO: 139 10^3/UL (ref 150–450)
RBC # BLD AUTO: 2.9 10^6/UL (ref 4–5.4)
WBC # BLD AUTO: 8.2 10^3/UL (ref 3.5–10.8)

## 2018-12-01 RX ADMIN — METOPROLOL SUCCINATE SCH MG: 25 TABLET, EXTENDED RELEASE ORAL at 21:06

## 2018-12-01 RX ADMIN — INSULIN LISPRO SCH: 100 INJECTION, SOLUTION INTRAVENOUS; SUBCUTANEOUS at 08:34

## 2018-12-01 RX ADMIN — FUROSEMIDE SCH MG: 10 INJECTION, SOLUTION INTRAMUSCULAR; INTRAVENOUS at 09:51

## 2018-12-01 RX ADMIN — ASPIRIN SCH MG: 81 TABLET, COATED ORAL at 09:50

## 2018-12-01 RX ADMIN — TIOTROPIUM BROMIDE SCH CAP: 18 CAPSULE ORAL; RESPIRATORY (INHALATION) at 07:56

## 2018-12-01 RX ADMIN — FUROSEMIDE SCH MG: 10 INJECTION, SOLUTION INTRAMUSCULAR; INTRAVENOUS at 17:34

## 2018-12-01 RX ADMIN — CLOPIDOGREL SCH MG: 75 TABLET, FILM COATED ORAL at 09:51

## 2018-12-01 RX ADMIN — INSULIN LISPRO SCH UNIT: 100 INJECTION, SOLUTION INTRAVENOUS; SUBCUTANEOUS at 17:34

## 2018-12-01 RX ADMIN — INSULIN LISPRO SCH UNIT: 100 INJECTION, SOLUTION INTRAVENOUS; SUBCUTANEOUS at 21:06

## 2018-12-01 RX ADMIN — TAMSULOSIN HYDROCHLORIDE SCH MG: 0.4 CAPSULE ORAL at 21:06

## 2018-12-01 RX ADMIN — FUROSEMIDE SCH: 10 INJECTION, SOLUTION INTRAMUSCULAR; INTRAVENOUS at 09:56

## 2018-12-01 RX ADMIN — INSULIN LISPRO SCH UNIT: 100 INJECTION, SOLUTION INTRAVENOUS; SUBCUTANEOUS at 13:11

## 2018-12-01 RX ADMIN — ATORVASTATIN CALCIUM SCH MG: 10 TABLET, FILM COATED ORAL at 09:50

## 2018-12-01 RX ADMIN — BUPROPION HYDROCHLORIDE SCH MG: 150 TABLET, FILM COATED, EXTENDED RELEASE ORAL at 10:32

## 2018-12-01 RX ADMIN — METOPROLOL SUCCINATE SCH MG: 25 TABLET, EXTENDED RELEASE ORAL at 09:50

## 2018-12-01 NOTE — HP
CC:  Dr. Hitchcock.

 

HISTORY AND PHYSICAL:

 

DATE OF ADMISSION:  11/30/18

 

TIME OF ADMISSION:  05:15 p.m.

 

CHIEF COMPLAINT:  "I could not breathe well."

 

HISTORY OF PRESENT ILLNESS:  This is an 89-year-old man with a history of 
coronary artery disease, chronic diastolic heart failure and chronic kidney 
disease, who presents with shortness of breath that he states started a month 
ago, but has been ongoing for the last 3 years.  Then, he notes that few days 
ago that his breathing got worse.  He went to see his primary care physician, 
who's office notes we have and she ordered a chest x-ray for shortness of breath
, some labs, which showed stable renal dysfunction, elevated BNP, and she urged 
him to go to the emergency department.  She also started him on torsemide at 
that time.  He declined coming to the ED at that time.  However, at home, his 
breathing continued to be worst and so he decided to call the EMS this morning.
  He has been taking the torsemide, but actually noticed that his urine output 
has not been increasing and may be even decreasing.  He notes weight loss, he 
thinks he lost about 45 pounds in 1 month. His family friend is here with him.  
She called the EMS today and she agrees that he has actually been losing 
weight.  He states he has had no appetite, although he did eat soups and stews 
over the past 2 days.  His friend also reports recent hallucinations.  Dr. Hitchcock's note also comments on this and she ordered MRI. He denies having 
any recent chest pain.  He denies fevers.  He does think he has had some 
dysuria.  He has had a cough, productive of phlegm.

 

PAST MEDICAL HISTORY:  Coronary artery disease, chronic kidney disease, BPH, 
COPD, type 2 diabetes, hypertension, diastolic heart failure.

 

HOME MEDICATIONS:

1.  Amlodipine 5 mg daily.

2.  Glimepiride 1 mg daily.

3.  Torsemide 10 mg daily.

4.  Aspirin 81 mg daily.

5.  Atorvastatin 10 mg daily.

6.  Wellbutrin 150 mg daily.

7.  Plavix 75 mg daily.

8.  Metoprolol XL 25 mg b.i.d.

9.  Nitroglycerin 0.5 sublingual q.5 p.r.n. chest pain.

10.  Flomax 0.4 p.o. q.h.s.

11.  Spiriva 1 cap inhaled daily.

 

SOCIAL HISTORY:  He smokes cigarettes daily less than a half a pack.  He does 
not drink alcohol.  His surrogate decision maker would be his wife, Tonie if 
he were unable to make decisions.  Tonie's phone is [phone].

 

REVIEW OF SYSTEMS:  As per the HPI, remainder of review of systems is negative

 

                               PHYSICAL EXAMINATION

 

GENERAL:  Alert, elderly man, in no distress.

 

VITAL SIGNS:  Temperature 97.6, heart rate 69, respiratory rate 25, pulse ox 100
% on 2 liters, no hypoxia is documented, blood pressure 154/79.

 

HEENT:  Eyes are injected.  Pupils are equal round and reactive to light.

 

NECK:  JVP is noted to approximately 10 cm of water.

 

LUNGS:  Have decreased breath sounds at the bases, but no crackles.

 

CHEST:  He is in a regular rate and rhythm.  I hear no murmurs.

 

ABDOMEN:  Obese.  He has an incision that is from an AAA repair and stent.  He 
has no guarding or rebound.

 

EXTREMITIES:  He has right lower extremity edema greater than left lower 
extremity edema.  He has old indentations on his legs.  His legs are nontender 
to palpation. His pulses are 2+ bilaterally.  He has edema bilaterally.  His 
strength is 5/5 throughout.

 

 DIAGNOSTIC STUDIES/LAB DATA:  Labs:  White blood cell is 8.9, hemoglobin 10.0, 
platelets 148, INR 0.95.  Sodium 138, potassium 3.9, chloride 108, BUN 48, 
creatinine 4.67, glucose 197, lactic 1.1.  Troponin 0.08.

 

Imaging:  Chest x-ray shows pulmonary vascular congestion.  An EKG is pending.  
He had a venous Doppler study done on 11/28/18, which showed no evidence of DVT 
on the right leg.

 

EKG:  He is in normal sinus rhythm with a normal axis.  He has a widened QRS at 
157 with the right bundle-branch morphology.  He has no Q waves.  He has T wave 
inversions across the precordium.

 

ASSESSMENT AND PLAN:  This is an 89-year-old man just with diastolic heart 
failure and chronic kidney disease, who presents to the emergency department 
with shortness of breath.

 

1.  Volume overload, which is likely multifactorial and related to 
decompensated heart failure and decompensated renal failure.  He reports eating 
stew and soup lately, which I suspect were exceptionally salty.  He has been on 
torsemide for 3 days at home without much improvement.  I am going to start him 
on high dose IV Lasix to overcome his significant renal dysfunction.  He was 
not noted to be hypoxic on admission, but he complains of shortness of breath.  
Pneumonia is also on the differential for his shortness of breath; however, 
given his worsening renal function and history of salty foods, I suspect he 
needs diuresis.  I am going to monitor strict In's and O's, check daily weights
, and attempt to diurese him.

2.  Acute-on-chronic kidney disease.  I have his results from [facility] and it 
actually shows that this creatinine today of 4.67 is not far from his baseline 
over the past month.  On 10/31/18, his creatinine was 4.3, on 10/26/18, it was 
4.6, and on 10/23/18, it was 4.5.  This was a sudden change since June when it 
was 2. However, I am not sure if that started to occur between June and 
October.  The etiology of his kidney disease is not clear to me at this time.  
I am checking an urinalysis for protein.  He has not taken any NSAIDs or other 
nephrotoxic medications.  He certainly needs a nephrologist and a strict 
monitoring of his urine output in case he is obstructing or going into oliguric 
renal failure.

3.  Coronary artery disease.  He does have 2 stents.  He has not had any chest 
pain during these episodes.  His EKG is unchanged from a prior EKG with a right 
bundle-branch block, although I do note that his lateral T wave inversions are 
more diffuse now, but I suspect his elevated troponin is more so related to 
demand in the setting of volume as supposed to a type 1 myocardial infarction.  
I will continue to trend his troponins.  Continue ASA, plavix, statin. 

4. Type II Diabetes.  There are no diabetic medications on his med list.  Will 
order fingersticks. 

5. BPH, continue flomax.

6. DVT ppx. heparin sc. 

 

 

 

[id number] #: [id number]

MTDD

## 2018-12-01 NOTE — PN
Subjective


Date of Service: 12/01/18


Interval History: 


Pt seen and examined.  Meds and labs reviewed.  Mentions that he is not on O2 

at home but currently at 4L of O2.





CC: Improved SOB





ROS:  Denied HA/dizziness, F/C, N/V, CP, increased cough, sputum production, 

abd pain, diarrhea, constipation, dysuria, myalgias, arthralgias, throat pain, 

and new skin lesions.  The rest of the 14 point ROS are unremarkable.





PHYSICAL EXAM:


GEN APPEARANCE: Awake, not in acute distress


HEENT: NC/AT, PERRLA, moist oral mucosa, (-) throat erythema


NECK: Soft, supple, (-) cervical LAD, (-)JVD


HEART: S1S2 WNL, RRR, No MRG


CHEST: CTA, BL, GAE, No W/R/R


ABD: Soft, ND/NT, NABS 4x Q


EXT: No C/C/BLLE edema, 2+ on R; 1+ on left


SKIN: Warm to touch


PSYCH: No active psychosis, hallucinations, depression, SI/HI





Objective


Active Medications: 








Acetaminophen (Tylenol Tab*)  650 mg PO Q4H PRN


   PRN Reason: FEVER/PAIN


Aspirin (Aspirin Ec Tab*)  81 mg PO DAILY [facility]


   Last Admin: 12/01/18 09:50 Dose:  81 mg


Atorvastatin Calcium (Lipitor*)  10 mg PO DAILY [facility]


   Last Admin: 12/01/18 09:50 Dose:  10 mg


Bupropion HCl (Wellbutrin Xl *)  150 mg PO DAILY [facility]; Protocol


   Last Admin: 12/01/18 10:32 Dose:  150 mg


Clopidogrel Bisulfate (Plavix Tab*)  75 mg PO DAILY [facility]


   Last Admin: 12/01/18 09:51 Dose:  75 mg


Dextrose (D50w Syringe 50 Ml*)  12.5 gm IV PUSH .FOR FS < 60 - SS PRN


   PRN Reason: FS < 60


Furosemide (Lasix Iv*)  60 mg IV 0800,1700 [facility]


   Last Admin: 12/01/18 09:51 Dose:  60 mg


Heparin Sodium (Porcine) (Heparin Vial(*))  5,000 units SUBCUT Q12HR [facility]


Insulin Human Lispro (Humalog*)  0 units SUBCUT ACHS [facility]; Protocol


   Last Admin: 12/01/18 13:11 Dose:  4 unit


Metoprolol Succinate (Toprol Xl Tab*)  25 mg PO BID [facility]


   Last Admin: 12/01/18 09:50 Dose:  25 mg


Nitroglycerin (Nitroglycerin Tab 0.4 Mg*)  0.4 mg SL Q5M PRN


   PRN Reason: chest pain


Tamsulosin HCl (Flomax Cap*)  0.4 mg PO BEDTIME [facility]


   Last Admin: 11/30/18 20:27 Dose:  0.4 mg


Tiotropium Bromide (Spiriva Cap.Inh*)  1 cap INH DAILY [facility]


   Last Admin: 12/01/18 07:56 Dose:  1 cap








 Vital Signs - 8 hr











  12/01/18 12/01/18 12/01/18





  07:33 07:58 08:00


 


Temperature 97.8 F  


 


Pulse Rate 68 66 


 


Respiratory 18 18 18





Rate   


 


Blood Pressure 135/62  





(mmHg)   


 


O2 Sat by Pulse 100 97 





Oximetry   














  12/01/18





  11:39


 


Temperature 98.1 F


 


Pulse Rate 66


 


Respiratory 20





Rate 


 


Blood Pressure 161/73





(mmHg) 


 


O2 Sat by Pulse 100





Oximetry 











Oxygen Devices in Use Now: Nasal Cannula


Result Diagrams: 


 12/01/18 06:21





 12/01/18 06:21





Assess/Plan/Problems-Billing


Assessment: 











- Patient Problems


(1) SOB (shortness of breath)


Current Visit: Yes   Status: Acute   Code(s): R06.02 - SHORTNESS OF BREATH   

SNOMED Code(s): 720442420


   Comment: 


-Likely due to CHF exacerbation especially with improved SOB with current 

diuresis


-Continue IV Lasix BID


-Elevated D-dimer likely due to CHF exacerbation in the setting of acute on 

chronic RI, however, will consider CTA of chest on Monday if with exacerbation 

of slow defervesence or if PA pressures in echo is elevated


-Will obtain 2D echo in AM


-Check TSH in AM


-Will D/C regular unrestricted diet and instead place on low Sodium and heart 

healthy diet


   





(2) Acute on chronic renal insufficiency


Current Visit: Yes   Status: Acute   Code(s): N28.9 - DISORDER OF KIDNEY AND 

URETER, UNSPECIFIED; N18.9 - CHRONIC KIDNEY DISEASE, UNSPECIFIED   SNOMED Code(s

): 800188365


   Comment: 


-Stable since last month, however, doubling of renal function since June unclear


-Will obtain urine lytes for FE-Urea and obtain renal U/S to eval for post-

renal causes of failure   





(3) CAD (coronary artery disease)


Current Visit: No   Status: Chronic   Code(s): I25.10 - ATHSCL HEART DISEASE OF 

NATIVE CORONARY ARTERY W/O ANG PCTRS   SNOMED Code(s): 50127453


   Comment: 


-Continue ASA, Plavix, Atorvastatin, and Metoprolol   





(4) Type 2 diabetes mellitus


Current Visit: No   Status: Chronic   Comment: 


-Continue to hold PO meds


-Continue ISS


-Continue watchful waiting


   





(5) DVT prophylaxis


Current Visit: No   Status: Acute   Code(s): XER7493 -    SNOMED Code(s): 

134155575


   Comment: 


-Changed frequency of Heparin SQ to q12H   


Status and Disposition: 





-As above


-For possible D/C in 1-2 days

## 2018-12-02 LAB
BASOPHILS # BLD AUTO: 0 10^3/UL (ref 0–0.2)
EOSINOPHIL # BLD AUTO: 0.3 10^3/UL (ref 0–0.6)
HCT VFR BLD AUTO: 29 % (ref 42–52)
HGB BLD-MCNC: 9.5 G/DL (ref 14–18)
LYMPHOCYTES # BLD AUTO: 1.2 10^3/UL (ref 1–4.8)
MCH RBC QN AUTO: 33 PG (ref 27–31)
MCHC RBC AUTO-ENTMCNC: 33 G/DL (ref 31–36)
MCV RBC AUTO: 98 FL (ref 80–94)
MONOCYTES # BLD AUTO: 0.8 10^3/UL (ref 0–0.8)
NEUTROPHILS # BLD AUTO: 5.9 10^3/UL (ref 1.5–7.7)
NEUTROPHILS # BLD: 6.5 10^3/UL (ref 1.5–7.7)
NRBC # BLD AUTO: 0 10^3/UL
PLATELET # BLD AUTO: 143 10^3/UL (ref 150–450)
RBC # BLD AUTO: 2.9 10^6/UL (ref 4–5.4)
WBC # BLD AUTO: 8.2 10^3/UL (ref 3.5–10.8)

## 2018-12-02 RX ADMIN — ASPIRIN SCH MG: 81 TABLET, COATED ORAL at 08:14

## 2018-12-02 RX ADMIN — INSULIN LISPRO SCH UNIT: 100 INJECTION, SOLUTION INTRAVENOUS; SUBCUTANEOUS at 21:25

## 2018-12-02 RX ADMIN — FUROSEMIDE SCH MLS/HR: 10 INJECTION, SOLUTION INTRAMUSCULAR; INTRAVENOUS at 13:34

## 2018-12-02 RX ADMIN — FUROSEMIDE SCH MG: 10 INJECTION, SOLUTION INTRAMUSCULAR; INTRAVENOUS at 08:14

## 2018-12-02 RX ADMIN — TIOTROPIUM BROMIDE SCH CAP: 18 CAPSULE ORAL; RESPIRATORY (INHALATION) at 09:01

## 2018-12-02 RX ADMIN — CLOPIDOGREL SCH MG: 75 TABLET, FILM COATED ORAL at 08:14

## 2018-12-02 RX ADMIN — HEPARIN SODIUM SCH UNITS: 5000 INJECTION INTRAVENOUS; SUBCUTANEOUS at 21:25

## 2018-12-02 RX ADMIN — METOPROLOL SUCCINATE SCH MG: 25 TABLET, EXTENDED RELEASE ORAL at 21:24

## 2018-12-02 RX ADMIN — METOLAZONE SCH MG: 5 TABLET ORAL at 12:37

## 2018-12-02 RX ADMIN — METOPROLOL SUCCINATE SCH MG: 25 TABLET, EXTENDED RELEASE ORAL at 08:14

## 2018-12-02 RX ADMIN — INSULIN LISPRO SCH UNIT: 100 INJECTION, SOLUTION INTRAVENOUS; SUBCUTANEOUS at 12:37

## 2018-12-02 RX ADMIN — CALCIUM ACETATE SCH MG: 667 CAPSULE ORAL at 12:37

## 2018-12-02 RX ADMIN — ATORVASTATIN CALCIUM SCH MG: 10 TABLET, FILM COATED ORAL at 08:13

## 2018-12-02 RX ADMIN — TAMSULOSIN HYDROCHLORIDE SCH MG: 0.4 CAPSULE ORAL at 21:24

## 2018-12-02 RX ADMIN — INSULIN LISPRO SCH UNIT: 100 INJECTION, SOLUTION INTRAVENOUS; SUBCUTANEOUS at 16:58

## 2018-12-02 RX ADMIN — INSULIN LISPRO SCH: 100 INJECTION, SOLUTION INTRAVENOUS; SUBCUTANEOUS at 07:44

## 2018-12-02 RX ADMIN — CALCIUM ACETATE SCH MG: 667 CAPSULE ORAL at 16:58

## 2018-12-02 RX ADMIN — BUPROPION HYDROCHLORIDE SCH MG: 150 TABLET, FILM COATED, EXTENDED RELEASE ORAL at 08:14

## 2018-12-02 RX ADMIN — HEPARIN SODIUM SCH UNITS: 5000 INJECTION INTRAVENOUS; SUBCUTANEOUS at 08:14

## 2018-12-02 NOTE — PN
Subjective


Date of Service: 12/02/18


Interval History: 





Pt seen and examined.  Meds and labs reviewed.  Pt mentioned that he has had 

PALAFOX while transferring from bed to chair, but acute SOB resolved quickly.  Pt 

incontinent of urine, thus, I/Os recorded unreliable.





CC: PALAFOX





ROS:  Denied HA/dizziness, F/C, N/V, CP, increased cough, sputum production, 

abd pain, diarrhea, constipation, myalgias, arthralgias, throat pain, and new 

skin lesions.  The rest of the 14 point ROS are unremarkable.





PHYSICAL EXAM:


GEN APPEARANCE: Awake, not in acute distress


HEENT: NC/AT, PERRLA, moist oral mucosa, (-) throat erythema


NECK: Soft, supple, (-) cervical LAD, (-)JVD, (+)HJR


HEART: S1S2 WNL, RRR, No MRG


CHEST: CTA, BL, GAE, No W/R/R


ABD: Soft, ND/NT, NABS 4x Q


EXT: No C/C/BLLE edema, 2+ on R; 1+ on left


SKIN: Warm to touch


PSYCH: No active psychosis, hallucinations, depression, SI/HI





Objective


Active Medications: 








Acetaminophen (Tylenol Tab*)  650 mg PO Q4H PRN


   PRN Reason: FEVER/PAIN


Aspirin (Aspirin Ec Tab*)  81 mg PO DAILY [facility]


   Last Admin: 12/02/18 08:14 Dose:  81 mg


Atorvastatin Calcium (Lipitor*)  10 mg PO DAILY [facility]


   Last Admin: 12/02/18 08:13 Dose:  10 mg


Bupropion HCl (Wellbutrin Xl *)  150 mg PO DAILY [facility]; Protocol


   Last Admin: 12/02/18 08:14 Dose:  150 mg


Calcium Acetate (Phoslo Cap*)  1,334 mg PO AC [facility]


   Last Admin: 12/02/18 12:37 Dose:  1,334 mg


Clopidogrel Bisulfate (Plavix Tab*)  75 mg PO DAILY [facility]


   Last Admin: 12/02/18 08:14 Dose:  75 mg


Dextrose (D50w Syringe 50 Ml*)  12.5 gm IV PUSH .FOR FS < 60 - SS PRN


   PRN Reason: FS < 60


Heparin Sodium (Porcine) (Heparin Vial(*))  5,000 units SUBCUT Q12HR [facility]


   Last Admin: 12/02/18 08:14 Dose:  5,000 units


Furosemide 100 mg/ Sodium (Chloride)  100 mls @ 5 mls/hr IV Q20H [facility]; Protocol


   Stop: 12/04/18 11:59


   Last Admin: 12/02/18 13:34 Dose:  5 mls/hr


Insulin Human Lispro (Humalog*)  0 units SUBCUT ACHS [facility]; Protocol


   Last Admin: 12/02/18 12:37 Dose:  4 unit


Metolazone (Zaroxolyn Tab*)  5 mg PO DAILY@0830 [facility]


   Last Admin: 12/02/18 12:37 Dose:  5 mg


Metoprolol Succinate (Toprol Xl Tab*)  25 mg PO BID [facility]


   Last Admin: 12/02/18 08:14 Dose:  25 mg


Nitroglycerin (Nitroglycerin Tab 0.4 Mg*)  0.4 mg SL Q5M PRN


   PRN Reason: chest pain


Tamsulosin HCl (Flomax Cap*)  0.4 mg PO BEDTIME [facility]


   Last Admin: 12/01/18 21:06 Dose:  0.4 mg


Tiotropium Bromide (Spiriva Cap.Inh*)  1 cap INH DAILY [facility]


   Last Admin: 12/02/18 09:01 Dose:  1 cap








 Vital Signs - 8 hr











  12/02/18 12/02/18 12/02/18





  07:51 09:01 11:48


 


Temperature 98.0 F  98.2 F


 


Pulse Rate 66 68 58


 


Respiratory 18 18 20





Rate   


 


Blood Pressure 144/53  143/50





(mmHg)   


 


O2 Sat by Pulse 97 97 99





Oximetry   











Oxygen Devices in Use Now: Nasal Cannula


Result Diagrams: 


 12/02/18 05:39





 12/02/18 05:39





Assess/Plan/Problems-Billing


Assessment: 











- Patient Problems


(1) SOB (shortness of breath)


Current Visit: Yes   Status: Acute   Code(s): R06.02 - SHORTNESS OF BREATH   

SNOMED Code(s): 040936664


   Comment: 


-Likely due to CHF exacerbation especially with improved SOB with current 

diuresis


-Will D/C IV boluses of Lasix and instead will place on co-equivalent Lasix gtt 

dose along with Metolazone for synergy


-Elevated D-dimer likely due to CHF exacerbation in the setting of acute on 

chronic RI, however, since persistent PALAFOX despite high Lasix boluses along with 

echo data being unavailable, will order for V/Q scan in AM


-Will obtain 2D echo in AM


-TSH found to be normal


-Continue low Sodium and heart healthy diet


-BNP slightly improved---continue to monitor   





(2) Acute on chronic renal insufficiency


Current Visit: Yes   Status: Acute   Code(s): N28.9 - DISORDER OF KIDNEY AND 

URETER, UNSPECIFIED; N18.9 - CHRONIC KIDNEY DISEASE, UNSPECIFIED   SNOMED Code(s

): 311197097


   Comment: 


-Stable since last month, however, doubling of renal function since June unclear


-Will obtain urine lytes for FE-Urea and obtain renal U/S together suggest an 

intra-renal/medical cause of acute on chronic RI


-Given worsening renal functions likely due to chronic medical issues, along 

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 

pt on PhosLo qAC


-Given 1x dose of Calcitriol, 


-Will defer with renal for subsequent doses given corrected calcium level close 

to 9.5 and iPTH not yet 2.5-3x ULN   





(3) CAD (coronary artery disease)


Current Visit: No   Status: Chronic   Code(s): I25.10 - ATHSCL HEART DISEASE OF 

NATIVE CORONARY ARTERY W/O ANG PCTRS   SNOMED Code(s): 94332237


   Comment: 


-Continue ASA, Plavix, Atorvastatin, and Metoprolol   





(4) Type 2 diabetes mellitus


Current Visit: No   Status: Chronic   Comment: 


-Continue to hold PO meds


-Continue ISS


-Continue watchful waiting


   





(5) DVT prophylaxis


Current Visit: No   Status: Acute   Code(s): UHV7861 -    SNOMED Code(s): 

036195130


   Comment: 


-Changed frequency of Heparin SQ to q12H   


Status and Disposition: 





-As above


-For possible D/C in 1-2 days

## 2018-12-03 LAB
HCT VFR BLD AUTO: 30 % (ref 42–52)
HGB BLD-MCNC: 10.2 G/DL (ref 14–18)
MCH RBC QN AUTO: 33 PG (ref 27–31)
MCHC RBC AUTO-ENTMCNC: 34 G/DL (ref 31–36)
MCV RBC AUTO: 97 FL (ref 80–94)
PLATELET # BLD AUTO: 149 10^3/UL (ref 150–450)
RBC # BLD AUTO: 3.09 10^6/UL (ref 4–5.4)
WBC # BLD AUTO: 8.2 10^3/UL (ref 3.5–10.8)

## 2018-12-03 RX ADMIN — INSULIN LISPRO SCH UNIT: 100 INJECTION, SOLUTION INTRAVENOUS; SUBCUTANEOUS at 13:22

## 2018-12-03 RX ADMIN — BUPROPION HYDROCHLORIDE SCH MG: 150 TABLET, FILM COATED, EXTENDED RELEASE ORAL at 08:48

## 2018-12-03 RX ADMIN — TIOTROPIUM BROMIDE SCH CAP: 18 CAPSULE ORAL; RESPIRATORY (INHALATION) at 07:33

## 2018-12-03 RX ADMIN — CALCIUM ACETATE SCH MG: 667 CAPSULE ORAL at 13:22

## 2018-12-03 RX ADMIN — METOPROLOL SUCCINATE SCH MG: 25 TABLET, EXTENDED RELEASE ORAL at 08:49

## 2018-12-03 RX ADMIN — HEPARIN SODIUM SCH UNITS: 5000 INJECTION INTRAVENOUS; SUBCUTANEOUS at 21:36

## 2018-12-03 RX ADMIN — ATORVASTATIN CALCIUM SCH MG: 10 TABLET, FILM COATED ORAL at 08:49

## 2018-12-03 RX ADMIN — INSULIN LISPRO SCH: 100 INJECTION, SOLUTION INTRAVENOUS; SUBCUTANEOUS at 07:44

## 2018-12-03 RX ADMIN — TAMSULOSIN HYDROCHLORIDE SCH MG: 0.4 CAPSULE ORAL at 21:35

## 2018-12-03 RX ADMIN — HEPARIN SODIUM SCH UNITS: 5000 INJECTION INTRAVENOUS; SUBCUTANEOUS at 08:49

## 2018-12-03 RX ADMIN — FUROSEMIDE SCH MLS/HR: 10 INJECTION, SOLUTION INTRAMUSCULAR; INTRAVENOUS at 09:05

## 2018-12-03 RX ADMIN — INSULIN LISPRO SCH: 100 INJECTION, SOLUTION INTRAVENOUS; SUBCUTANEOUS at 16:42

## 2018-12-03 RX ADMIN — CLOPIDOGREL SCH MG: 75 TABLET, FILM COATED ORAL at 08:49

## 2018-12-03 RX ADMIN — INSULIN LISPRO SCH UNIT: 100 INJECTION, SOLUTION INTRAVENOUS; SUBCUTANEOUS at 21:36

## 2018-12-03 RX ADMIN — METOPROLOL SUCCINATE SCH MG: 25 TABLET, EXTENDED RELEASE ORAL at 21:35

## 2018-12-03 RX ADMIN — ASPIRIN SCH MG: 81 TABLET, COATED ORAL at 08:49

## 2018-12-03 RX ADMIN — CALCIUM ACETATE SCH MG: 667 CAPSULE ORAL at 08:49

## 2018-12-03 RX ADMIN — CALCIUM ACETATE SCH MG: 667 CAPSULE ORAL at 16:43

## 2018-12-03 RX ADMIN — METOLAZONE SCH MG: 5 TABLET ORAL at 08:49

## 2018-12-03 RX ADMIN — FUROSEMIDE SCH MLS/HR: 10 INJECTION, SOLUTION INTRAMUSCULAR; INTRAVENOUS at 16:48

## 2018-12-03 NOTE — PN
Subjective


Date of Service: 12/03/18


Interval History: 





Pt seen and examined.  Meds and labs reviewed.  





CC: N/A





ROS:  Denied HA/dizziness, F/C, N/V, CP, increased cough, sputum production, 

abd pain, diarrhea, constipation, myalgias, arthralgias, throat pain, and new 

skin lesions.  The rest of the 14 point ROS are unremarkable.





PHYSICAL EXAM:


GEN APPEARANCE: Awake, not in acute distress


HEENT: NC/AT, PERRLA, moist oral mucosa, (-) throat erythema


NECK: Soft, supple, (-) cervical LAD, (-)JVD, (+)HJR


HEART: S1S2 WNL, RRR, No MRG


CHEST: CTA, BL, GAE, No W/R/R


ABD: Soft, ND/NT, NABS 4x Q


EXT: No C/C/BLLE edema, 2+ on R; 1+ on left


SKIN: Warm to touch


PSYCH: No active psychosis, hallucinations, depression, SI/HI





Objective


Active Medications: 








Acetaminophen (Tylenol Tab*)  650 mg PO Q4H PRN


   PRN Reason: FEVER/PAIN


Aspirin (Aspirin Ec Tab*)  81 mg PO DAILY [facility]


   Last Admin: 12/03/18 08:49 Dose:  81 mg


Atorvastatin Calcium (Lipitor*)  10 mg PO DAILY [facility]


   Last Admin: 12/03/18 08:49 Dose:  10 mg


Bupropion HCl (Wellbutrin Xl *)  150 mg PO DAILY [facility]; Protocol


   Last Admin: 12/03/18 08:48 Dose:  150 mg


Calcium Acetate (Phoslo Cap*)  1,334 mg PO AC [facility]


   Last Admin: 12/03/18 13:22 Dose:  1,334 mg


Clopidogrel Bisulfate (Plavix Tab*)  75 mg PO DAILY [facility]


   Last Admin: 12/03/18 08:49 Dose:  75 mg


Dextrose (D50w Syringe 50 Ml*)  12.5 gm IV PUSH .FOR FS < 60 - SS PRN


   PRN Reason: FS < 60


Heparin Sodium (Porcine) (Heparin Vial(*))  5,000 units SUBCUT Q12HR [facility]


   Last Admin: 12/03/18 08:49 Dose:  5,000 units


Furosemide 100 mg/ Sodium (Chloride)  100 mls @ 6 mls/hr IV Q20H [facility]; Protocol


   Stop: 12/04/18 11:59


Insulin Human Lispro (Humalog*)  0 units SUBCUT ACHS [facility]; Protocol


   Last Admin: 12/03/18 13:22 Dose:  8 unit


Metolazone (Zaroxolyn Tab*)  5 mg PO DAILY@0830 [facility]


   Last Admin: 12/03/18 08:49 Dose:  5 mg


Metoprolol Succinate (Toprol Xl Tab*)  25 mg PO BID [facility]


   Last Admin: 12/03/18 08:49 Dose:  25 mg


Nitroglycerin (Nitroglycerin Tab 0.4 Mg*)  0.4 mg SL Q5M PRN


   PRN Reason: chest pain


Tamsulosin HCl (Flomax Cap*)  0.4 mg PO BEDTIME [facility]


   Last Admin: 12/02/18 21:24 Dose:  0.4 mg


Tiotropium Bromide (Spiriva Cap.Inh*)  1 cap INH DAILY [facility]


   Last Admin: 12/03/18 07:33 Dose:  1 cap








 Vital Signs - 8 hr











  12/03/18 12/03/18 12/03/18





  07:33 07:44 08:09


 


Temperature   98.6 F


 


Pulse Rate 72  64


 


Respiratory  20 16





Rate   


 


Blood Pressure   164/72





(mmHg)   


 


O2 Sat by Pulse 94  97





Oximetry   














  12/03/18





  12:13


 


Temperature 98.7 F


 


Pulse Rate 62


 


Respiratory 18





Rate 


 


Blood Pressure 166/66





(mmHg) 


 


O2 Sat by Pulse 99





Oximetry 











Oxygen Devices in Use Now: Nasal Cannula





- Nutrition: Malnutrition Diagnosis/Plan


Malnutrition Assessment by Registered Dietitian: 


Malnutrition Assessment





Clinical Characteristics         Acute,Moderate


Malnutrition Assessment:         11.6-19.2% wt loss x past month - severe


Criteria                         <75% EEE x > 7 days


                                 


Malnutrition Assessment:         Pt with severe wt loss that appears to have 

been


Interventions                    unintended per review of H&P and report of


                                 reduced appetite pta, despite conversation with


                                 pt today stating that wt loss was deliberate. 

Pt


                                 eating well here, so no intervention currently


                                 indicated; will follow for adequacy and make


                                 further recs as indicated.


Malnutrition Assessment: Goals   1. Intake will remain adequate to maintain


                                 stable wt without further unintended wt loss


                                 2. Phos will normalize with phos binder


                                 3. Cr will stabilize to baseline value per hx


                                 CKD








Result Diagrams: 


 12/03/18 06:07





 12/03/18 06:06





Assess/Plan/Problems-Billing


Assessment: 











- Patient Problems


(1) SOB (shortness of breath)


Current Visit: Yes   Status: Acute   Code(s): R06.02 - SHORTNESS OF BREATH   

SNOMED Code(s): 628258427


   Comment: 


-Likely due to CHF exacerbation especially with improved SOB with current 

diuresis


-CXR shows persistent interstitial edema---still on 3.5 L of O2 by NC at rest; 

no O2 at home


-Weight shows improvement and I/Os in the negatives today


-Will increase Lasix gtt to 6mg/hr


-Continue Metolazone


-V/Q scan low probability for PE


-Awaiting result of 2Decho


-TSH found to be normal


-Continue low Sodium and heart healthy diet   





(2) Acute on chronic renal insufficiency


Current Visit: Yes   Status: Acute   Code(s): N28.9 - DISORDER OF KIDNEY AND 

URETER, UNSPECIFIED; N18.9 - CHRONIC KIDNEY DISEASE, UNSPECIFIED   SNOMED Code(s

): 587198705


   Comment: 


-Stable since last month, however, doubling of renal function since June unclear


-Will obtain urine lytes for FE-Urea and obtain renal U/S together suggest an 

intra-renal/medical cause of acute on chronic RI


-Given worsening renal functions likely due to chronic medical issues, along 

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 

pt on PhosLo qAC


-Given 1x dose of Calcitriol, yesterday


-Will defer with renal for subsequent doses given corrected calcium level close 

to 9.5 and iPTH not yet 2.5-3x ULN      





(3) CAD (coronary artery disease)


Current Visit: No   Status: Chronic   Code(s): I25.10 - ATHSCL HEART DISEASE OF 

NATIVE CORONARY ARTERY W/O ANG PCTRS   SNOMED Code(s): 05789518


   Comment: 


-Continue ASA, Plavix, Atorvastatin, and Metoprolol   





(4) Type 2 diabetes mellitus


Current Visit: No   Status: Chronic   Comment: 


-Continue to hold PO meds


-Continue ISS


-Continue watchful waiting


   





(5) DVT prophylaxis


Current Visit: No   Status: Acute   Code(s): WRU6494 -    SNOMED Code(s): 

822177422


   Comment: 


-Changed frequency of Heparin SQ to q12H   


Status and Disposition: 


-As above


-Continue to monitor daily standing weights and I/Os


-For PT eval


-Consider ambulatory sats prior to planned D/C


-For possible D/C in 1-2 days

## 2018-12-03 NOTE — ECHO
Patient:      NOHEMY WHITLOCK  

Med Rec#:     [id number]            :          1928          

Date:         2018            Age:          89y                 

Account#:     [id number]          Height:       173 cm / 68.1 in

Accession#:   [id number]           Weight:       85.8 kg / 189.1 lbs

Sex:          M                     BSA:          2

Room#:        [facility]                

Admit Date#:  2018          

Type:         Inpatient

 

Referring:    Wally Reveles

Reading:      Moustapha Samson MD

Sonographer:  Mildred Dang RN RDCS

CC:           Lori Hitchcock MD

______________________________________________________________________

 

Transthoracic Echocardiogram

 

Indication:

CHF

BP:           166/66

HR:           57

Rhythm:       Bradycardia

 

Findings     

History:

CAD, CHF, HTN, DM, COPD, CKD, smoker 

 

Technical Comments:

The study is technically limited due to the patient's history of COPD. 

The study is technically limited due to the patient's smoking history. 

 

 

Left Ventricle:

The left ventricular chamber size is normal. Mild concentric left

ventricular hypertrophy is observed. There is increased basal septal

hypertrophy noted without evidence of an increased gradient across the

left ventricular outflow tract.  There is global hypokinesis of the left

ventricle with minor regional variation. There is severely decreased

left ventricular systolic function. The estimated ejection fraction is

25-30%.  Abnormal left ventricular diastolic function is observed. 

 

Left Atrium:

The left atrium is mildly dilated. 

 

Right Ventricle:

The right ventricular cavity size is normal. The right ventricular

global systolic function is normal. 

 

Right Atrium:

The right atrium is mildly dilated.  

 

Aortic Valve:

The aortic valve structure is not well visualized. The aortic valve

leaflets are mildly thickened. There is trace to mild aortic

regurgitation. There is no evidence of aortic stenosis. 

 

Mitral Valve:

The mitral valve leaflets are mildly thickened. There is mild to

moderate mitral regurgitation.  There is no evidence of mitral stenosis.

 

 

Tricuspid Valve:

The tricuspid valve leaflets are normal.  There is trace to mild

tricuspid regurgitation. Unable to estimate the right ventricular

systolic pressure.   There is no tricuspid stenosis. 

 

Pulmonic Valve:

The pulmonic valve structure is not well visualized. 

 

Pericardium:

There is no significant pericardial effusion. A left pleural effusion is

present. 

 

Aorta:

There is no dilatation of the ascending aorta. The aortic arch is not

well visualized.  There is moderate dilatation of the aortic root. 

 

Pulmonary Artery:

The main pulmonary artery is not well visualized. 

 

Venous:

The inferior vena cava appears normal in size. There is no change in the

dimension of the inferior vena cava with respiration consistent with

markedly increased right atrial pressure. 

 

Conclusions

There is increased basal septal hypertrophy noted without evidence of an

increased gradient across the left ventricular outflow tract. 

There is global hypokinesis of the left ventricle with minor regional

variation.

There is severely decreased left ventricular systolic function.

The estimated ejection fraction is 25-30%. 

There is trace to mild aortic regurgitation.

There is no evidence of aortic stenosis.

There is mild to moderate mitral regurgitation. 

There is trace to mild tricuspid regurgitation.

Unable to estimate the right ventricular systolic pressure.  

There is no significant pericardial effusion.

A left pleural effusion is present.

Compared to study of 18, the LV dysfunction is new   Valve function

is the same

 

Measurements     

Name                    Value         Normal Range            

RVDdMajor (2D)          3.3 cm        (2.2 - 4.4)             

RAd ISD 4CH             5 cm          (3.4 - 4.9)             

RA (A4C)W               3.7 cm        (2.9 - 4.6)             

IVSd (2D)               1.1 cm        (0.6 - 1)               

LVPWd (2D)              1.1 cm        (0.6 - 1)               

LVIDd (2D)              6 cm          (3.6 - 5.4)             

LVIDs (2D)              5.2 cm        -                        

LV FS (2D)              13 %          (25 - 45)               

Aortic Annulus          2.5 cm        (1.4 - 2.6)             

Ao root diameter (2D)   4 cm          (2.1 - 3.5)             

Ascending Ao            3.4 cm        (2.1 - 3.4)             

LA dimension (AP) 2D    4.7 cm        (2.3 - 3.8)             

LAd ISD 4CH             4.7 cm        (2.9 - 5.3)             

LA ISD 4CH W            4.8 cm        (2.5 - 4.5)             

 

Name                    Value         Normal Range            

LA ESV BP (A/L) index   31.3 ml/m2    -                        

 

Name                    Value         Normal Range            

MV E-wave Vmax          0.58 m/sec    -                        

MV deceleration time    401 msec      -                        

MV A-wave Vmax          0.96 m/sec    -                        

LV septal e' Vmax       0.07 m/sec    -                        

LV lateral e' Vmax      0.05 m/sec    -                        

LV E:e' septal ratio    8.3 ratio     -                        

LV E:e' lateral ratio   11.6 ratio    -                        

 

Name                    Value         Normal Range            

AV Vmax                 1.3 m/sec     -                        

AV VTI                  30.2 cm       -                        

AV peak gradient        6 mmHg        -                        

AV mean gradient        3 mmHg        -                        

LVOT Vmax               0.55 m/sec    -                        

LVOT VTI                12.1 cm       -                        

LVOT peak gradient      1 mmHg        -                        

LVOT mean gradient      1 mmHg        -                        

 

Name                    Value         Normal Range            

IVC diameter            2 cm          -                        

 

Name                    Value         Normal Range            

PV Vmax                 0.47 m/sec    -                        

 

Electronically signed by: Moustapha Samson MD on 2018 17:55:57

## 2018-12-04 LAB
AST SERPL-CCNC: 11 U/L (ref 13–39)
BASOPHILS # BLD AUTO: 0.1 10^3/UL (ref 0–0.2)
EOSINOPHIL # BLD AUTO: 0.3 10^3/UL (ref 0–0.6)
HCT VFR BLD AUTO: 30 % (ref 42–52)
HGB BLD-MCNC: 10.2 G/DL (ref 14–18)
LYMPHOCYTES # BLD AUTO: 1.3 10^3/UL (ref 1–4.8)
MCH RBC QN AUTO: 33 PG (ref 27–31)
MCHC RBC AUTO-ENTMCNC: 34 G/DL (ref 31–36)
MCV RBC AUTO: 97 FL (ref 80–94)
MONOCYTES # BLD AUTO: 0.7 10^3/UL (ref 0–0.8)
NEUTROPHILS # BLD AUTO: 5.8 10^3/UL (ref 1.5–7.7)
NRBC # BLD AUTO: 0 10^3/UL
NRBC BLD QL AUTO: 0
PLATELET # BLD AUTO: 164 10^3/UL (ref 150–450)
RBC # BLD AUTO: 3.08 10^6/UL (ref 4–5.4)
WBC # BLD AUTO: 8.2 10^3/UL (ref 3.5–10.8)

## 2018-12-04 RX ADMIN — INSULIN LISPRO SCH UNIT: 100 INJECTION, SOLUTION INTRAVENOUS; SUBCUTANEOUS at 20:22

## 2018-12-04 RX ADMIN — METOLAZONE SCH MG: 5 TABLET ORAL at 08:16

## 2018-12-04 RX ADMIN — CLOPIDOGREL SCH MG: 75 TABLET, FILM COATED ORAL at 08:16

## 2018-12-04 RX ADMIN — INSULIN LISPRO SCH UNIT: 100 INJECTION, SOLUTION INTRAVENOUS; SUBCUTANEOUS at 17:23

## 2018-12-04 RX ADMIN — CALCIUM ACETATE SCH MG: 667 CAPSULE ORAL at 17:23

## 2018-12-04 RX ADMIN — CALCIUM ACETATE SCH MG: 667 CAPSULE ORAL at 08:16

## 2018-12-04 RX ADMIN — FUROSEMIDE SCH: 10 INJECTION, SOLUTION INTRAMUSCULAR; INTRAVENOUS at 11:18

## 2018-12-04 RX ADMIN — FUROSEMIDE SCH MLS/HR: 10 INJECTION, SOLUTION INTRAMUSCULAR; INTRAVENOUS at 07:30

## 2018-12-04 RX ADMIN — HEPARIN SODIUM SCH UNITS: 5000 INJECTION INTRAVENOUS; SUBCUTANEOUS at 20:22

## 2018-12-04 RX ADMIN — INSULIN LISPRO SCH UNIT: 100 INJECTION, SOLUTION INTRAVENOUS; SUBCUTANEOUS at 08:16

## 2018-12-04 RX ADMIN — BUPROPION HYDROCHLORIDE SCH MG: 150 TABLET, FILM COATED, EXTENDED RELEASE ORAL at 08:16

## 2018-12-04 RX ADMIN — ASPIRIN SCH MG: 81 TABLET, COATED ORAL at 08:16

## 2018-12-04 RX ADMIN — INSULIN LISPRO SCH UNIT: 100 INJECTION, SOLUTION INTRAVENOUS; SUBCUTANEOUS at 12:28

## 2018-12-04 RX ADMIN — TAMSULOSIN HYDROCHLORIDE SCH MG: 0.4 CAPSULE ORAL at 20:22

## 2018-12-04 RX ADMIN — ATORVASTATIN CALCIUM SCH MG: 10 TABLET, FILM COATED ORAL at 08:16

## 2018-12-04 RX ADMIN — METOPROLOL SUCCINATE SCH MG: 25 TABLET, EXTENDED RELEASE ORAL at 08:16

## 2018-12-04 RX ADMIN — METOPROLOL SUCCINATE SCH MG: 25 TABLET, EXTENDED RELEASE ORAL at 20:22

## 2018-12-04 RX ADMIN — CALCIUM ACETATE SCH MG: 667 CAPSULE ORAL at 12:28

## 2018-12-04 RX ADMIN — TIOTROPIUM BROMIDE SCH CAP: 18 CAPSULE ORAL; RESPIRATORY (INHALATION) at 08:30

## 2018-12-04 RX ADMIN — HEPARIN SODIUM SCH UNITS: 5000 INJECTION INTRAVENOUS; SUBCUTANEOUS at 08:16

## 2018-12-04 NOTE — PN
Subjective


Date of Service: 12/04/18


Interval History: 





Patient seen and examined. VERY hard of hearing, states his breathing is "ok on 

the oxygen" and feels that he needs the supplemental O2. Denies chest pain, no 

fevers or chills, no further complaints.





Objective


Active Medications: 








Acetaminophen (Tylenol Tab*)  650 mg PO Q4H PRN


   PRN Reason: FEVER/PAIN


Aspirin (Aspirin Ec Tab*)  81 mg PO DAILY [facility]


   Last Admin: 12/04/18 08:16 Dose:  81 mg


Atorvastatin Calcium (Lipitor*)  10 mg PO DAILY [facility]


   Last Admin: 12/04/18 08:16 Dose:  10 mg


Bupropion HCl (Wellbutrin Xl *)  150 mg PO DAILY [facility]; Protocol


   Last Admin: 12/04/18 08:16 Dose:  150 mg


Calcium Acetate (Phoslo Cap*)  1,334 mg PO AC [facility]


   Last Admin: 12/04/18 17:23 Dose:  1,334 mg


Clopidogrel Bisulfate (Plavix Tab*)  75 mg PO DAILY [facility]


   Last Admin: 12/04/18 08:16 Dose:  75 mg


Dextrose (D50w Syringe 50 Ml*)  12.5 gm IV PUSH .FOR FS < 60 - SS PRN


   PRN Reason: FS < 60


Heparin Sodium (Porcine) (Heparin Vial(*))  5,000 units SUBCUT Q12HR [facility]


   Last Admin: 12/04/18 08:16 Dose:  5,000 units


Insulin Human Lispro (Humalog*)  0 units SUBCUT ACHS [facility]; Protocol


   Last Admin: 12/04/18 17:23 Dose:  4 unit


Metolazone (Zaroxolyn Tab*)  5 mg PO DAILY@0830 [facility]


   Last Admin: 12/04/18 08:16 Dose:  5 mg


Metoprolol Succinate (Toprol Xl Tab*)  25 mg PO BID [facility]


   Last Admin: 12/04/18 08:16 Dose:  25 mg


Nitroglycerin (Nitroglycerin Tab 0.4 Mg*)  0.4 mg SL Q5M PRN


   PRN Reason: chest pain


Tamsulosin HCl (Flomax Cap*)  0.4 mg PO BEDTIME [facility]


   Last Admin: 12/03/18 21:35 Dose:  0.4 mg


Tiotropium Bromide (Spiriva Cap.Inh*)  1 cap INH DAILY [facility]


   Last Admin: 12/04/18 08:30 Dose:  1 cap








 Vital Signs - 8 hr











  12/04/18 12/04/18 12/04/18





  15:41 20:00 20:05


 


Temperature 97.9 F  98.7 F


 


Pulse Rate 56  71


 


Respiratory 18 16 16





Rate   


 


Blood Pressure 137/42  178/59





(mmHg)   


 


O2 Sat by Pulse 98  99





Oximetry   











Oxygen Devices in Use Now: Nasal Cannula


Appearance: alert, deconditioned appearing


Eyes: No Scleral Icterus, PERRLA


Ears/Nose/Mouth/Throat: NL Teeth, Lips, Gums, - - hearing aid left ear


Neck: NL Appearance and Movements; NL JVP


Respiratory: - - diminished throughout, fine rales noted bilaterally


Cardiovascular: NL Sounds; No Murmurs; No JVD


Extremities: No Edema, No Clubbing, Cyanosis


Neurological: Alert and Oriented x 3


Nutrition: Taking PO's





- Nutrition: Malnutrition Diagnosis/Plan


Malnutrition Assessment by Registered Dietitian: 


Malnutrition Assessment





Clinical Characteristics         Acute,Moderate


Malnutrition Assessment:         11.6-19.2% wt loss x past month - severe


Criteria                         <75% EEE x > 7 days


                                 


Malnutrition Assessment:         Pt with severe wt loss that appears to have 

been


Interventions                    unintended per review of H&P and report of


                                 reduced appetite pta, despite conversation with


                                 pt today stating that wt loss was deliberate. 

Pt


                                 eating well here, so no intervention currently


                                 indicated; will follow for adequacy and make


                                 further recs as indicated.


Malnutrition Assessment: Goals   1. Intake will remain adequate to maintain


                                 stable wt without further unintended wt loss


                                 2. Phos will normalize with phos binder


                                 3. Cr will stabilize to baseline value per hx


                                 CKD








Result Diagrams: 


 12/04/18 05:57





 12/04/18 08:38





Assess/Plan/Problems-Billing


Assessment: 





This is 





- Patient Problems


(1) SOB (shortness of breath)


Code(s): R06.02 - SHORTNESS OF BREATH   SNOMED Code(s): 340121646


   Comment: 


 - Likely due to CHF exacerbation, acute on chronic systolic HF, EF 25-30%


 - CXR shows persistent interstitial edema and remains oxygen dependent


 - I&Os


 - Lasix drip DCd, change to oral torsemide, continue metolazone


-Continue Metolazone


-V/Q scan low probability for PE


-Awaiting result of 2Decho


-TSH found to be normal


-Continue low Sodium and heart healthy diet   





(2) Acute on chronic renal insufficiency


Code(s): N28.9 - DISORDER OF KIDNEY AND URETER, UNSPECIFIED; N18.9 - CHRONIC 

KIDNEY DISEASE, UNSPECIFIED   SNOMED Code(s): 039183929


   Comment:   


 - Doubling of creat this admission, multifactorial


 - Phos-lo started


 - Avoid nephro toxic meds


 - Urine output adequate


 - Patient electing Hsopice, not likely candidate for dialysis


 - supportive care   





(3) CAD (coronary artery disease)


Code(s): I25.10 - ATHSCL HEART DISEASE OF NATIVE CORONARY ARTERY W/O ANG PCTRS 

  SNOMED Code(s): 53679094


   Comment: 


 - Continue ASA, Plavix, Atorvastatin, and Metoprolol   





(4) COPD (chronic obstructive pulmonary disease)


Code(s): J44.9 - CHRONIC OBSTRUCTIVE PULMONARY DISEASE, UNSPECIFIED   SNOMED 

Code(s): 34956854


   Comment: 


 - Spiriva, O2 as needed   





(5) HTN (hypertension)


Code(s): I10 - ESSENTIAL (PRIMARY) HYPERTENSION   SNOMED Code(s): 71867388


   Comment: 


 - BP labile, continue home meds   





(6) DVT prophylaxis


Current Visit: No   Status: Acute   Code(s): IVI1531 -    SNOMED Code(s): 

576063400


   Comment: 


-Changed frequency of Heparin SQ to q12H   


Status and Disposition: 


Inpatient, plan for discharge to home hospice when optimized and hospice sign 

on can be arranged.

## 2018-12-04 NOTE — CONSULT
Palliative / Hospice Consult


Ordering Provider: Wally Reveles - Dr. Stephanie Hitchcock is her 

primary





- Subjective


Code Status: DNR


Advance Directives Location: In Chart


MOLST Part A Completed: Yes


Date: 12/04/18


MOLST Part E Completed:: Yes - partially completed


Date: 12/04/18





- History or Present Illness


History or Present Illness: 





88 yo male with CAD, Diastolic heart failure, chronic kidney disease, COPD, BPH

, HTN and diabetes who presented to the ER with SOB. He was going to travel to 

[place] but was not feeling well and having more trouble breathing. His CXR 

showed cardiomegaly with persistent interstitial edema, echo showed L pleural 

effusion, LV dysfunction with EF of 25-30% showing systolic heart failure in 

addition to diastolic failure, his EGFR is 11.2 showing chronic kidney disease, 

albumin is 2.6  and he has lost more than 10% of his weight in less than a year 

showing malnutrition.  


Lab Values: 


 Abnormal Lab Results











  12/02/18 12/03/18 12/03/18





  05:39 12:36 16:40


 


WBC   


 


RBC   


 


Hgb   


 


Hct   


 


MCV   


 


MCH   


 


MCHC   


 


RDW   


 


Plt Count   


 


MPV   


 


Neut % (Auto)   


 


Lymph % (Auto)   


 


Mono % (Auto)   


 


Eos % (Auto)   


 


Baso % (Auto)   


 


Absolute Neuts (auto)   


 


Absolute Lymphs (auto)   


 


Absolute Monos (auto)   


 


Absolute Eos (auto)   


 


Absolute Basos (auto)   


 


Absolute Nucleated RBC   


 


Nucleated RBC %   


 


Hem Pathologist Commnt    


 


Sodium   


 


Potassium   


 


Chloride   


 


Carbon Dioxide   


 


Anion Gap   


 


BUN   


 


Creatinine   


 


Est GFR ( Amer)   


 


Est GFR (Non-Af Amer)   


 


BUN/Creatinine Ratio   


 


Glucose   


 


POC Glucose (mg/dL)   324 H  128 H


 


Calcium   


 


Phosphorus   


 


Magnesium   


 


Total Bilirubin   


 


AST   


 


ALT   


 


Alkaline Phosphatase   


 


B-Natriuretic Peptide   


 


Total Protein   


 


Albumin   


 


Globulin   


 


Albumin/Globulin Ratio   














  12/03/18 12/04/18 12/04/18





  21:21 05:57 05:57


 


WBC   8.2 


 


RBC   3.08 L 


 


Hgb   10.2 L 


 


Hct   30 L 


 


MCV   97 H 


 


MCH   33 H 


 


MCHC   34 


 


RDW   15 


 


Plt Count   164 


 


MPV   8.1 


 


Neut % (Auto)   71.5 


 


Lymph % (Auto)   15.9 


 


Mono % (Auto)   8.3 


 


Eos % (Auto)   3.2 


 


Baso % (Auto)   1.1 


 


Absolute Neuts (auto)   5.8 


 


Absolute Lymphs (auto)   1.3 


 


Absolute Monos (auto)   0.7 


 


Absolute Eos (auto)   0.3 


 


Absolute Basos (auto)   0.1 


 


Absolute Nucleated RBC   0 


 


Nucleated RBC %   0 


 


Hem Pathologist Commnt   


 


Sodium    135


 


Potassium    TNP


 


Chloride    98 L


 


Carbon Dioxide    25


 


Anion Gap    12 H


 


BUN    68 H


 


Creatinine    4.91 H


 


Est GFR ( Amer)    13.6


 


Est GFR (Non-Af Amer)    11.2


 


BUN/Creatinine Ratio    13.8


 


Glucose    128 H


 


POC Glucose (mg/dL)  145 H  


 


Calcium    8.3 L


 


Phosphorus    5.3 H


 


Magnesium    2.1


 


Total Bilirubin    0.40


 


AST    TNP


 


ALT    7


 


Alkaline Phosphatase    51


 


B-Natriuretic Peptide   


 


Total Protein    5.4 L


 


Albumin    2.7 L


 


Globulin    2.7


 


Albumin/Globulin Ratio    1.0














  12/04/18 12/04/18 12/04/18





  05:57 07:37 08:38


 


WBC   


 


RBC   


 


Hgb   


 


Hct   


 


MCV   


 


MCH   


 


MCHC   


 


RDW   


 


Plt Count   


 


MPV   


 


Neut % (Auto)   


 


Lymph % (Auto)   


 


Mono % (Auto)   


 


Eos % (Auto)   


 


Baso % (Auto)   


 


Absolute Neuts (auto)   


 


Absolute Lymphs (auto)   


 


Absolute Monos (auto)   


 


Absolute Eos (auto)   


 


Absolute Basos (auto)   


 


Absolute Nucleated RBC   


 


Nucleated RBC %   


 


Hem Pathologist Commnt   


 


Sodium   


 


Potassium    3.3 L


 


Chloride   


 


Carbon Dioxide   


 


Anion Gap   


 


BUN   


 


Creatinine   


 


Est GFR ( Amer)   


 


Est GFR (Non-Af Amer)   


 


BUN/Creatinine Ratio   


 


Glucose   


 


POC Glucose (mg/dL)   147 H 


 


Calcium   


 


Phosphorus   


 


Magnesium   


 


Total Bilirubin   


 


AST    11 L


 


ALT   


 


Alkaline Phosphatase   


 


B-Natriuretic Peptide  1518 H  


 


Total Protein   


 


Albumin   


 


Globulin   


 


Albumin/Globulin Ratio   








 Laboratory Last Values











WBC  8.2 10^3/ul (3.5-10.8)   12/04/18  05:57    


 


RBC  3.08 10^6/ul (4.00-5.40)  L  12/04/18  05:57    


 


Hgb  10.2 g/dl (14.0-18.0)  L  12/04/18  05:57    


 


Hct  30 % (42-52)  L  12/04/18  05:57    


 


MCV  97 fL (80-94)  H  12/04/18  05:57    


 


MCH  33 pg (27-31)  H  12/04/18  05:57    


 


MCHC  34 g/dl (31-36)   12/04/18  05:57    


 


RDW  15 % (10.5-15)   12/04/18  05:57    


 


Plt Count  164 10^3/ul (150-450)   12/04/18  05:57    


 


MPV  8.1 fL (7.4-10.4)   12/04/18  05:57    


 


Neut % (Auto)  71.5 %  12/04/18  05:57    


 


Lymph % (Auto)  15.9 %  12/04/18  05:57    


 


Mono % (Auto)  8.3 %  12/04/18  05:57    


 


Eos % (Auto)  3.2 %  12/04/18  05:57    


 


Baso % (Auto)  1.1 %  12/04/18  05:57    


 


Absolute Neuts (auto)  5.8 10^3/ul (1.5-7.7)   12/04/18  05:57    


 


Absolute Lymphs (auto)  1.3 10^3/ul (1.0-4.8)   12/04/18  05:57    


 


Absolute Monos (auto)  0.7 10^3/ul (0-0.8)   12/04/18  05:57    


 


Absolute Eos (auto)  0.3 10^3/ul (0-0.6)   12/04/18  05:57    


 


Absolute Basos (auto)  0.1 10^3/ul (0-0.2)   12/04/18  05:57    


 


Absolute Nucleated RBC  0 10^3/ul  12/04/18  05:57    


 


Immature Gran %  2 % (0-9)   12/02/18  05:39    


 


Neutrophils %  77 %  12/02/18  05:39    


 


Band Neutrophils %  2 % (0-8)   12/02/18  05:39    


 


Lymphocytes %  10 %  12/02/18  05:39    


 


Monocytes %  8 %  12/02/18  05:39    


 


Eosinophils %  3 %  12/02/18  05:39    


 


Nucleated RBC %  0   12/04/18  05:57    


 


Abs Neuts (Manual)  6.5 10^3/ul (1.5-7.7)   12/02/18  05:39    


 


Abs Lymphs (Manual)  0.8 10^3/ul (1.0-4.8)  L  12/02/18  05:39    


 


Abs Monocytes (Manual)  0.7 10^3/ul (0-0.8)   12/02/18  05:39    


 


Absolute Eos (Manual)  0.2 10^3/ul (0-0.6)   12/02/18  05:39    


 


Normal RBC Morphology  Not Reportable   12/02/18  05:39    


 


Anisocytosis  1+   12/02/18  05:39    


 


Elliptocytes  1+   12/02/18  05:39    


 


Hem Pathologist Commnt     12/02/18  05:39    


 


INR (Anticoag Therapy)  0.94  (0.77-1.02)   12/01/18  06:20    


 


APTT  31.1 seconds (26.0-36.3)   11/30/18  15:12    


 


D-Dimer, Quantitative  > 1050 ng/mL (Less Than 230)  H  11/30/18  15:12    


 


Sodium  135 mmol/L (135-145)   12/04/18  05:57    


 


Potassium  3.3 mmol/L (3.5-5.0)  L  12/04/18  08:38    


 


Chloride  98 mmol/L (101-111)  L  12/04/18  05:57    


 


Carbon Dioxide  25 mmol/L (22-32)   12/04/18  05:57    


 


Anion Gap  12 mmol/L (2-11)  H  12/04/18  05:57    


 


BUN  68 mg/dL (6-24)  H  12/04/18  05:57    


 


Creatinine  4.91 mg/dL (0.67-1.17)  H  12/04/18  05:57    


 


Est GFR ( Amer)  13.6  (>60)   12/04/18  05:57    


 


Est GFR (Non-Af Amer)  11.2  (>60)   12/04/18  05:57    


 


BUN/Creatinine Ratio  13.8  (8-20)   12/04/18  05:57    


 


Glucose  128 mg/dL ()  H  12/04/18  05:57    


 


POC Glucose (mg/dL)  147 mg/dL ()  H  12/04/18  07:37    


 


Lactic Acid  1.1 mmol/L (0.5-2.0)   11/30/18  15:12    


 


Calcium  8.3 mg/dL (8.6-10.3)  L  12/04/18  05:57    


 


Phosphorus  5.3 mg/dL (2.5-5.0)  H  12/04/18  05:57    


 


Magnesium  2.1 mg/dL (1.9-2.7)   12/04/18  05:57    


 


Total Bilirubin  0.40 mg/dL (0.2-1.0)   12/04/18  05:57    


 


AST  11 U/L (13-39)  L  12/04/18  08:38    


 


ALT  7 U/L (7-52)   12/04/18  05:57    


 


Alkaline Phosphatase  51 U/L ()   12/04/18  05:57    


 


CK-MB (CK-2)  3.2 ng/mL (0.6-6.3)   11/30/18  15:12    


 


Troponin I  0.07 ng/mL (<0.04)  H*  11/30/18  23:11    


 


C-Reactive Protein  18.33 mg/L (<8.01)  H  11/30/18  15:12    


 


B-Natriuretic Peptide  1518 pg/mL (<=100)  H  12/04/18  05:57    


 


Total Protein  5.4 g/dL (6.4-8.9)  L  12/04/18  05:57    


 


Albumin  2.7 g/dL (3.2-5.2)  L  12/04/18  05:57    


 


Globulin  2.7 g/dL (2-4)   12/04/18  05:57    


 


Albumin/Globulin Ratio  1.0  (1-3)   12/04/18  05:57    


 


25-OH Vitamin D Total  < 7.0 ng/mL (20-50)  L  12/02/18  10:00    


 


TSH  2.87 mcIU/mL (0.34-5.60)   12/02/18  05:39    


 


PTH Intact  13.3 pmol/L (1.3-9.3)  H  12/02/18  10:00    


 


Calcium (PTH Intact)  8.0 mg/dL (8.6-10.3)  L  12/02/18  10:00    


 


Ur Creatinine Concen  29.67 mg/dL  12/01/18  22:13    


 


Ur Urea Nitrogen Conc  205 mg/dL  12/01/18  22:13    














- Objective


Active Medications: 








Acetaminophen (Tylenol Tab*)  650 mg PO Q4H PRN


   PRN Reason: FEVER/PAIN


Aspirin (Aspirin Ec Tab*)  81 mg PO DAILY [facility]


   Last Admin: 12/04/18 08:16 Dose:  81 mg


Atorvastatin Calcium (Lipitor*)  10 mg PO DAILY [facility]


   Last Admin: 12/04/18 08:16 Dose:  10 mg


Bupropion HCl (Wellbutrin Xl *)  150 mg PO DAILY [facility]; Protocol


   Last Admin: 12/04/18 08:16 Dose:  150 mg


Calcium Acetate (Phoslo Cap*)  1,334 mg PO AC [facility]


   Last Admin: 12/04/18 08:16 Dose:  1,334 mg


Clopidogrel Bisulfate (Plavix Tab*)  75 mg PO DAILY [facility]


   Last Admin: 12/04/18 08:16 Dose:  75 mg


Dextrose (D50w Syringe 50 Ml*)  12.5 gm IV PUSH .FOR FS < 60 - SS PRN


   PRN Reason: FS < 60


Heparin Sodium (Porcine) (Heparin Vial(*))  5,000 units SUBCUT Q12HR [facility]


   Last Admin: 12/04/18 08:16 Dose:  5,000 units


Furosemide 100 mg/ Sodium (Chloride)  100 mls @ 6 mls/hr IV Q20H [facility]; Protocol


   Stop: 12/04/18 11:59


   Last Admin: 12/04/18 07:30 Dose:  6 mls/hr


Insulin Human Lispro (Humalog*)  0 units SUBCUT ACHS [facility]; Protocol


   Last Admin: 12/04/18 08:16 Dose:  1 unit


Metolazone (Zaroxolyn Tab*)  5 mg PO DAILY@0830 [facility]


   Last Admin: 12/04/18 08:16 Dose:  5 mg


Metoprolol Succinate (Toprol Xl Tab*)  25 mg PO BID [facility]


   Last Admin: 12/04/18 08:16 Dose:  25 mg


Nitroglycerin (Nitroglycerin Tab 0.4 Mg*)  0.4 mg SL Q5M PRN


   PRN Reason: chest pain


Tamsulosin HCl (Flomax Cap*)  0.4 mg PO BEDTIME [facility]


   Last Admin: 12/03/18 21:35 Dose:  0.4 mg


Tiotropium Bromide (Spiriva Cap.Inh*)  1 cap INH DAILY [facility]


   Last Admin: 12/04/18 08:30 Dose:  1 cap








Vital Signs: 


Vital Signs:











Temp Pulse Resp BP Pulse Ox


 


 98.1 F   61   18   131/78   96 


 


 12/04/18 07:31  12/04/18 07:31  12/04/18 07:49  12/04/18 07:31  12/04/18 07:31











Patient Weight: 


 





Weight                           82.01 kg








Intake and Output: 


 Intake & Output











 12/02/18 12/03/18 12/04/18 12/05/18





 06:59 06:59 06:59 06:59


 


Intake Total 1210 1051 673 240


 


Output Total 0 3050 3720 


 


Balance 1210 -1999 -3046 240


 


Weight  79.379 kg 82.01 kg 


 


Intake:    


 


  IV Fluids  56 203 


 


    Lasix  56 203 


 


  Medicated IV  15  


 


    Lasix  15  


 


  Oral 1210 980 470 240


 


Output:    


 


  Urine 0 0  


 


  [place]  3050 3720 


 


  Liquid Stool   0 


 


Other:    


 


  Estimated Void Large Large Large 


 


  # Bowel Movements 0 0 0 


 


  Estimated Stool Amount   Small 


 


  # Voids 1 3  





 





ADLs: Meal  Record                                         Start:  11/30/18 18:

52


Freq:   DAILY@0900,1400,1800                               Status: Active      

  


Protocol:                                                                      

  


 Created      11/30/18 18:52  System  (Rec: 11/30/18 18:52  System  TELE-C02)


 Document     12/01/18 13:45  [id number]  (Rec: 12/01/18 13:45  [id number]  TELE-C15)


 Document     12/01/18 18:00  [id number]  (Rec: 12/01/18 21:42  [id number]  TELE-C09)


 Document     12/02/18 09:00  [id number]  (Rec: 12/02/18 15:04  [id number]  TELE-C01)


 Document     12/02/18 14:00  [id number]  (Rec: 12/02/18 15:14  [id number]  TELE-C01)


 Document     12/02/18 20:52  [id number]  (Rec: 12/02/18 20:52  [id number]  TELE-C07)


 Document     12/03/18 09:00  [id number]  (Rec: 12/03/18 10:59  [id number]  TELE-C09)


 Document     12/03/18 14:00  [id number]  (Rec: 12/03/18 14:51  [id number]  TELE-C09)


 Document     12/03/18 18:38  [id number]  (Rec: 12/03/18 18:38  [id number]  TELE-C01)


 Document     12/04/18 09:00  [id number]  (Rec: 12/04/18 10:11  [id number]  TELE-C09)


Intake and Output                                          Start:  11/30/18 14:

11


Freq:                                                      Status: Active      

  


Protocol:                                                                      

  


 Created      11/30/18 14:11  System  (Rec: 11/30/18 14:11  System  [id number])


Intake and Output                                          Start:  11/30/18 18:

52


Freq:   DAILY@0600,1400,2200                               Status: Active      

  


Protocol:                                                                      

  


 Created      11/30/18 18:52  System  (Rec: 11/30/18 18:52  System  TELE-C02)


 Document     11/30/18 21:36  [id number]  (Rec: 11/30/18 21:37  [id number]  TELE-C09)


 Document     12/01/18 00:06  [id number]  (Rec: 12/01/18 00:07  [id number]  TELE-C05)


 Document     12/01/18 05:55  [id number]  (Rec: 12/01/18 06:00  [id number]  TELE-C33)


 Document     12/01/18 14:00  [id number]  (Rec: 12/01/18 15:14  [id number]  TELE-C01)


 Document     12/01/18 21:42  [id number]  (Rec: 12/01/18 21:45  [id number]  TELE-C09)


 Document     12/02/18 06:00  [id number]  (Rec: 12/02/18 06:08  [id number]  TELE-C34)


 Document     12/02/18 14:00  [id number]  (Rec: 12/02/18 15:14  [id number]  TELE-C01)


 Document     12/02/18 22:48  [id number]  (Rec: 12/02/18 22:49  [id number]  TELE-C01)


 Document     12/03/18 06:00  [id number]  (Rec: 12/03/18 06:06  [id number]  TELE-C34)


 Document     12/03/18 14:00  [id number]  (Rec: 12/03/18 14:18  [id number]  TELE-C03)


 Document     12/03/18 19:41  [id number]  (Rec: 12/03/18 19:41  [id number]  TELE-M11)


 Document     12/03/18 21:46  [id number]  (Rec: 12/03/18 21:46  [id number]  TELE-C13)


 Document     12/03/18 22:43  [id number]  (Rec: 12/03/18 22:43  [id number]  TELE-C13)


 Document     12/04/18 05:57  [id number]  (Rec: 12/04/18 05:58  [id number]  TELE-C34)








Head: Normal


Eyes: No Scleral Icterus


Neck: NL Appearance and Movements; NL JVP


Cardiovascular: NL Sounds; No Murmurs; No JVD, RRR


Respiratory: Clear to Auscultation, Clear to Percussion


Extremities: - - mild pitting edema


Neurological: Alert and Oriented x 3





- Assessment


Assessment: 





88 yo male with systolic and diastolic heart failure, chronic kidney disease, 

malnutrition, CAD, COPD, BPH, HTN & DM





- Plan


Consult Plan (MU): Hospice


Plan: 





1. Long discussion with patient about desires for care at this time he does not 

what CPR or intubation, MOLST form was partially completed because patient 

wants to speak with his wife about feeding tube and antibiotic choices.  Also 

spoke with wife about his wishes. 





2. He is a candidate for hospice care based on his CHF EF 25-30% and chronic 

renal disease and nutritional status alb 2.6 and 10% unintentional weight loss. 

He wants to stay at home with his wife and have hospice see him there. This was 

discussed with his primary care provider Dr. Hitchcock and his wife.





- Time On Unit


Date of Evaluation: 12/04/18


Hospice Consult Time in: 09:30


Hospice Consult Time Out: 11:00


Hospice Consult Time Total: 90


> 50% of Time Spend In Counseling or Coordinating Care: Yes

## 2018-12-05 RX ADMIN — TORSEMIDE SCH MG: 20 TABLET ORAL at 09:05

## 2018-12-05 RX ADMIN — ASPIRIN SCH MG: 81 TABLET, COATED ORAL at 09:04

## 2018-12-05 RX ADMIN — TIOTROPIUM BROMIDE SCH CAP: 18 CAPSULE ORAL; RESPIRATORY (INHALATION) at 07:44

## 2018-12-05 RX ADMIN — ATORVASTATIN CALCIUM SCH MG: 10 TABLET, FILM COATED ORAL at 09:04

## 2018-12-05 RX ADMIN — CALCIUM ACETATE SCH MG: 667 CAPSULE ORAL at 17:55

## 2018-12-05 RX ADMIN — METOPROLOL SUCCINATE SCH MG: 25 TABLET, EXTENDED RELEASE ORAL at 21:39

## 2018-12-05 RX ADMIN — TAMSULOSIN HYDROCHLORIDE SCH MG: 0.4 CAPSULE ORAL at 21:39

## 2018-12-05 RX ADMIN — HEPARIN SODIUM SCH UNITS: 5000 INJECTION INTRAVENOUS; SUBCUTANEOUS at 09:05

## 2018-12-05 RX ADMIN — INSULIN LISPRO SCH UNIT: 100 INJECTION, SOLUTION INTRAVENOUS; SUBCUTANEOUS at 17:55

## 2018-12-05 RX ADMIN — BUPROPION HYDROCHLORIDE SCH MG: 150 TABLET, FILM COATED, EXTENDED RELEASE ORAL at 09:05

## 2018-12-05 RX ADMIN — METOLAZONE SCH MG: 5 TABLET ORAL at 09:05

## 2018-12-05 RX ADMIN — CALCIUM ACETATE SCH MG: 667 CAPSULE ORAL at 12:43

## 2018-12-05 RX ADMIN — METOPROLOL SUCCINATE SCH MG: 25 TABLET, EXTENDED RELEASE ORAL at 09:04

## 2018-12-05 RX ADMIN — CALCIUM ACETATE SCH MG: 667 CAPSULE ORAL at 09:04

## 2018-12-05 RX ADMIN — INSULIN LISPRO SCH UNIT: 100 INJECTION, SOLUTION INTRAVENOUS; SUBCUTANEOUS at 09:05

## 2018-12-05 RX ADMIN — HEPARIN SODIUM SCH UNITS: 5000 INJECTION INTRAVENOUS; SUBCUTANEOUS at 21:39

## 2018-12-05 RX ADMIN — INSULIN LISPRO SCH UNIT: 100 INJECTION, SOLUTION INTRAVENOUS; SUBCUTANEOUS at 21:39

## 2018-12-05 RX ADMIN — INSULIN LISPRO SCH UNIT: 100 INJECTION, SOLUTION INTRAVENOUS; SUBCUTANEOUS at 12:43

## 2018-12-05 RX ADMIN — CLOPIDOGREL SCH MG: 75 TABLET, FILM COATED ORAL at 09:04

## 2018-12-05 NOTE — PN
Subjective


Date of Service: 12/05/18


Interval History: 











pt reports he feels strong enough to go home and hopes to go home tomorrow. He 

agrees to hospice. He would like to keep his priest going home for comfort. He 

denies pain. reports good appetite. 











Objective


Active Medications: 








Acetaminophen (Tylenol Tab*)  650 mg PO Q4H PRN


   PRN Reason: FEVER/PAIN


Aspirin (Aspirin Ec Tab*)  81 mg PO DAILY [facility]


   Last Admin: 12/05/18 09:04 Dose:  81 mg


Atorvastatin Calcium (Lipitor*)  10 mg PO DAILY [facility]


   Last Admin: 12/05/18 09:04 Dose:  10 mg


Bupropion HCl (Wellbutrin Xl *)  150 mg PO DAILY [facility]; Protocol


   Last Admin: 12/05/18 09:05 Dose:  150 mg


Calcium Acetate (Phoslo Cap*)  1,334 mg PO AC [facility]


   Last Admin: 12/05/18 12:43 Dose:  1,334 mg


Clopidogrel Bisulfate (Plavix Tab*)  75 mg PO DAILY [facility]


   Last Admin: 12/05/18 09:04 Dose:  75 mg


Dextrose (D50w Syringe 50 Ml*)  12.5 gm IV PUSH .FOR FS < 60 - SS PRN


   PRN Reason: FS < 60


Heparin Sodium (Porcine) (Heparin Vial(*))  5,000 units SUBCUT Q12HR [facility]


   Last Admin: 12/05/18 09:05 Dose:  5,000 units


Insulin Human Lispro (Humalog*)  0 units SUBCUT ACHS [facility]; Protocol


   Last Admin: 12/05/18 12:43 Dose:  6 unit


Metolazone (Zaroxolyn Tab*)  5 mg PO DAILY@0830 [facility]


   Last Admin: 12/05/18 09:05 Dose:  5 mg


Metoprolol Succinate (Toprol Xl Tab*)  25 mg PO BID [facility]


   Last Admin: 12/05/18 09:04 Dose:  25 mg


Nitroglycerin (Nitroglycerin Tab 0.4 Mg*)  0.4 mg SL Q5M PRN


   PRN Reason: chest pain


Tamsulosin HCl (Flomax Cap*)  0.4 mg PO BEDTIME [facility]


   Last Admin: 12/04/18 20:22 Dose:  0.4 mg


Tiotropium Bromide (Spiriva Cap.Inh*)  1 cap INH DAILY [facility]


   Last Admin: 12/05/18 07:44 Dose:  1 cap


Torsemide (Demadex*)  20 mg PO DAILY [facility]


   Last Admin: 12/05/18 09:05 Dose:  20 mg








 Vital Signs - 8 hr











  12/05/18 12/05/18 12/05/18





  09:52 12:12 15:48


 


Temperature  98.6 F 97.6 F


 


Pulse Rate 63 56 65


 


Respiratory  18 16





Rate   


 


Blood Pressure 130/60 144/48 134/54





(mmHg)   


 


O2 Sat by Pulse 97 99 99





Oximetry   











Oxygen Devices in Use Now: Nasal Cannula


Appearance: 89 yo male A+O x3 in NAD.


Eyes: No Scleral Icterus, PERRLA


Ears/Nose/Mouth/Throat: Mucous Membranes Moist, - - multi missing teeth


Respiratory: Symmetrical Chest Expansion and Respiratory Effort, Clear to 

Auscultation


Cardiovascular: NL Sounds; No Murmurs; No JVD, RRR, No Edema


Abdominal: NL Sounds; No Tenderness; No Distention


Extremities: No Edema, No Clubbing, Cyanosis


Neurological: Alert and Oriented x 3, NL Sensation, NL Muscle Strength and Tone


Lines/Tubes/Other Access: Clean, Dry and Intact Peripheral IV


Nutrition: Taking PO's





- Nutrition: Malnutrition Diagnosis/Plan


Malnutrition Assessment by Registered Dietitian: 


Malnutrition Assessment





Clinical Characteristics         Acute,Moderate


Malnutrition Assessment:         11.6-19.2% wt loss x past month - severe


Criteria                         <75% EEE x > 7 days


                                 


Malnutrition Assessment:         Pt with severe wt loss that appears to have 

been


Interventions                    unintended per review of H&P and report of


                                 reduced appetite pta, despite conversation with


                                 pt today stating that wt loss was deliberate. 

Pt


                                 eating well here, so no intervention currently


                                 indicated; will follow for adequacy and make


                                 further recs as indicated.


Malnutrition Assessment: Goals   1. Intake will remain adequate to maintain


                                 stable wt without further unintended wt loss


                                 2. Phos will normalize with phos binder


                                 3. Cr will stabilize to baseline value per hx


                                 CKD








Result Diagrams: 


 12/04/18 05:57





 12/04/18 08:38





Assess/Plan/Problems-Billing


Assessment: 











- Patient Problems


(1) SOB (shortness of breath)


Comment: 


-  multifactorial. Improving. 


 - Likely due to CHF exacerbation, acute on chronic systolic HF, EF 25-30%


 - CXR shows persistent interstitial edema and remains oxygen dependent


 - I&Os


 - Lasix drip DCd, change to oral torsemide, continue metolazone


- V/Q scan low probability for PE


-TSH found to be normal


-Continue low Sodium and heart healthy diet   





(2) Acute on chronic renal insufficiency


Comment:   


 - creatinine 4.9, multifactorial


 - Phos-lo started


 - Avoid nephro toxic meds


 - Urine output adequate


 - Patient electing Hospice, not likely candidate for dialysis


 - supportive care   





(3) CAD (coronary artery disease)


Comment: 


 - Continue ASA, Plavix, Atorvastatin, and Metoprolol   





(4) COPD (chronic obstructive pulmonary disease)


Comment: 


 - Spiriva, O2 as needed   





(5) HTN (hypertension)


Comment: 


 - BP labile, continue home meds   





(6) DVT prophylaxis


Comment: SQ heparin    


Status and Disposition: 


Inpatient, plan for discharge to home hospice when optimized and hospice sign 

on can be arranged.

## 2018-12-06 VITALS — DIASTOLIC BLOOD PRESSURE: 46 MMHG | SYSTOLIC BLOOD PRESSURE: 139 MMHG

## 2018-12-06 RX ADMIN — BUPROPION HYDROCHLORIDE SCH MG: 150 TABLET, FILM COATED, EXTENDED RELEASE ORAL at 08:57

## 2018-12-06 RX ADMIN — METOLAZONE SCH MG: 5 TABLET ORAL at 08:57

## 2018-12-06 RX ADMIN — INSULIN LISPRO SCH UNIT: 100 INJECTION, SOLUTION INTRAVENOUS; SUBCUTANEOUS at 09:00

## 2018-12-06 RX ADMIN — ATORVASTATIN CALCIUM SCH MG: 10 TABLET, FILM COATED ORAL at 08:58

## 2018-12-06 RX ADMIN — INSULIN LISPRO SCH UNIT: 100 INJECTION, SOLUTION INTRAVENOUS; SUBCUTANEOUS at 12:09

## 2018-12-06 RX ADMIN — CLOPIDOGREL SCH MG: 75 TABLET, FILM COATED ORAL at 08:58

## 2018-12-06 RX ADMIN — TIOTROPIUM BROMIDE SCH CAP: 18 CAPSULE ORAL; RESPIRATORY (INHALATION) at 07:42

## 2018-12-06 RX ADMIN — CALCIUM ACETATE SCH MG: 667 CAPSULE ORAL at 12:09

## 2018-12-06 RX ADMIN — METOPROLOL SUCCINATE SCH MG: 25 TABLET, EXTENDED RELEASE ORAL at 08:57

## 2018-12-06 RX ADMIN — HEPARIN SODIUM SCH UNITS: 5000 INJECTION INTRAVENOUS; SUBCUTANEOUS at 09:00

## 2018-12-06 RX ADMIN — ASPIRIN SCH MG: 81 TABLET, COATED ORAL at 08:58

## 2018-12-06 RX ADMIN — CALCIUM ACETATE SCH MG: 667 CAPSULE ORAL at 08:58

## 2018-12-06 RX ADMIN — TORSEMIDE SCH MG: 20 TABLET ORAL at 08:58

## 2018-12-06 NOTE — PN
Subjective


Date of Service: 12/06/18


Interval History: 








patient reports he is ready to go home. he denies SOB. Reports he feels steady 

on his feet. Pts wife and sister in law at bedside agree with plan for DC to 

home today. 











Objective


Active Medications: 








Acetaminophen (Tylenol Tab*)  650 mg PO Q4H PRN


   PRN Reason: FEVER/PAIN


Aspirin (Aspirin Ec Tab*)  81 mg PO DAILY [facility]


   Last Admin: 12/06/18 08:58 Dose:  81 mg


Atorvastatin Calcium (Lipitor*)  10 mg PO DAILY [facility]


   Last Admin: 12/06/18 08:58 Dose:  10 mg


Bupropion HCl (Wellbutrin Xl *)  150 mg PO DAILY [facility]; Protocol


   Last Admin: 12/06/18 08:57 Dose:  150 mg


Calcium Acetate (Phoslo Cap*)  1,334 mg PO AC [facility]


   Last Admin: 12/06/18 12:09 Dose:  1,334 mg


Clopidogrel Bisulfate (Plavix Tab*)  75 mg PO DAILY [facility]


   Last Admin: 12/06/18 08:58 Dose:  75 mg


Dextrose (D50w Syringe 50 Ml*)  12.5 gm IV PUSH .FOR FS < 60 - SS PRN


   PRN Reason: FS < 60


Heparin Sodium (Porcine) (Heparin Vial(*))  5,000 units SUBCUT Q12HR [facility]


   Last Admin: 12/06/18 09:00 Dose:  5,000 units


Insulin Human Lispro (Humalog*)  0 units SUBCUT ACHS [facility]; Protocol


   Last Admin: 12/06/18 12:09 Dose:  6 unit


Metolazone (Zaroxolyn Tab*)  5 mg PO DAILY@0830 [facility]


   Last Admin: 12/06/18 08:57 Dose:  5 mg


Metoprolol Succinate (Toprol Xl Tab*)  25 mg PO BID [facility]


   Last Admin: 12/06/18 08:57 Dose:  25 mg


Nitroglycerin (Nitroglycerin Tab 0.4 Mg*)  0.4 mg SL Q5M PRN


   PRN Reason: chest pain


Tamsulosin HCl (Flomax Cap*)  0.4 mg PO BEDTIME [facility]


   Last Admin: 12/05/18 21:39 Dose:  0.4 mg


Tiotropium Bromide (Spiriva Cap.Inh*)  1 cap INH DAILY [facility]


   Last Admin: 12/06/18 07:42 Dose:  1 cap


Torsemide (Demadex*)  20 mg PO DAILY [facility]


   Last Admin: 12/06/18 08:58 Dose:  20 mg








 Vital Signs - 8 hr











  12/06/18 12/06/18 12/06/18





  07:43 08:00 08:01


 


Temperature   


 


Pulse Rate 58  58


 


Respiratory 14 18 





Rate   


 


Blood Pressure   125/56





(mmHg)   


 


O2 Sat by Pulse 99  97





Oximetry   














  12/06/18





  11:47


 


Temperature 98.0 F


 


Pulse Rate 62


 


Respiratory 16





Rate 


 


Blood Pressure 139/46





(mmHg) 


 


O2 Sat by Pulse 97





Oximetry 











Oxygen Devices in Use Now: Nasal Cannula


Appearance: 89 yo male A+O x3 in NAD


Eyes: No Scleral Icterus, PERRLA


Ears/Nose/Mouth/Throat: Mucous Membranes Moist


Respiratory: Symmetrical Chest Expansion and Respiratory Effort, Clear to 

Auscultation


Cardiovascular: NL Sounds; No Murmurs; No JVD, No Edema


Abdominal: NL Sounds; No Tenderness; No Distention


Extremities: No Edema, No Clubbing, Cyanosis


Skin: No Rash or Ulcers, No Nodules or Sclerosis


Neurological: Alert and Oriented x 3, NL Sensation, NL Muscle Strength and Tone


Lines/Tubes/Other Access: Clean, Dry and Intact Peripheral IV


Nutrition: Taking PO's





- Nutrition: Malnutrition Diagnosis/Plan


Malnutrition Assessment by Registered Dietitian: 


Malnutrition Assessment





Clinical Characteristics         Acute,Moderate


Malnutrition Assessment:         11.6-19.2% wt loss x past month - severe


Criteria                         <75% EEE x > 7 days


                                 


Malnutrition Assessment:         Pt with severe wt loss that appears to have 

been


Interventions                    unintended per review of H&P and report of


                                 reduced appetite pta, despite conversation with


                                 pt today stating that wt loss was deliberate. 

Pt


                                 eating well here, so no intervention currently


                                 indicated; will follow for adequacy and make


                                 further recs as indicated.


Malnutrition Assessment: Goals   1. Intake will remain adequate to maintain


                                 stable wt without further unintended wt loss


                                 2. Phos will normalize with phos binder


                                 3. Cr will stabilize to baseline value per hx


                                 CKD








Result Diagrams: 


 12/04/18 05:57





 12/04/18 08:38





Assess/Plan/Problems-Billing


Assessment: 89 yo male with PMH of CAD, diastolic HF, CKD, COPD, BPH, DM2, HTN 

who presented to the ER with SOB who was found to have acute systolic and 

diastolic HF, cardiomegaly with interstitial edema, EF 25-30%, worsening renal 

failure, malnutrition with a >10% weight loss in the past year who is going 

home on hospice











- Patient Problems


(1) SOB (shortness of breath)


Comment: 


-  multifactorial. Stable - plan for DC to home with hospice sign on tomorrow


 - Likely due to CHF exacerbation, acute on chronic systolic and diastolic HF, 

EF 25-30%


 - CXR shows persistent interstitial edema and remains oxygen dependent - but 

improved from admission


 - I&Os


 - Continue torsemide, continue metolazone


- V/Q scan low probability for PE


-TSH found to be normal


-Continue low Sodium and heart healthy diet


- DC cem   





(2) Acute on chronic renal insufficiency


Comment:   


 - creatinine 4.9, multifactorial


 - Phos-lo started


 - Avoid nephro toxic meds


 - Urine output adequate


 - Patient electing Hospice, not likely candidate for dialysis


 - supportive care   





(3) CAD (coronary artery disease)


Comment: 


 - Continue ASA, Plavix, Atorvastatin, and Metoprolol   





(4) COPD (chronic obstructive pulmonary disease)


Comment: 


 - Spiriva, O2 as needed   





(5) HTN (hypertension)


Comment: 


 - BP labile, continue home meds   





(6) DVT prophylaxis


Comment: SQ heparin    


Status and Disposition: 


Inpatient, plan for discharge to home today, hospice sign on tomorrow

## 2018-12-07 NOTE — DS
DISCHARGE SUMMARY:

 

DATE OF ADMISSION:  11/30/18

 

DATE OF DISCHARGE:  12/06/18

 

PROVIDER:  Maribeth Holcomb NP

 

ATTENDING PHYSICIAN:  Dr. Jones * (report dictated by Maribeth Holcomb NP).

 

PRIMARY CARE PROVIDER:  Dr. Hitchcock.

 

DISCHARGE DIAGNOSES:

1.  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

2.  Acute on chronic renal failure.

3.  Malnutrition.

 

SECONDARY DIAGNOSES:

1.  Non-insulin type 2 diabetes.

2.  Coronary artery disease.

3.  Benign prostatic hyperplasia.

4.  Chronic obstructive pulmonary disease.

5.  Hypertension.

 

DISCHARGE MEDICATIONS:

1.  Torsemide 10 mg p.o. daily.

2.  Zaroxolyn 5 mg p.o. every other day (new medication).

3.  Acetaminophen 60 mg p.o. q.6 hours p.r.n.

4.  Amlodipine 5 mg p.o. daily.

5.  Aspirin 81 mg p.o. daily.

6.  Lipitor 10 mg p.o. daily.

7.  Wellbutrin  mg p.o. daily.

8.  Plavix 75 mg p.o. daily.

9.  Glimepiride 1 mg p.o. q.a.m.

10.  Metoprolol succinate XL 25 mg p.o. b.i.d.

11.  Nitroglycerin 0.4 mg sublingual q.5 minutes p.r.n.

12.  Flomax 0.4 mg p.o. at bedtime.

13.  Spiriva 1 cap INH daily.

 

HISTORY OF PRESENT ILLNESS AND HOSPITAL COURSE:  Please see history and 
physical by Dr. Marycarmen Newberry, for full admission details; but in summary, 
this is an 89-year- old male with a past medical history of coronary artery 
disease, chronic kidney disease, diastolic heart failure, non-insulin-dependent 
type 2 diabetes, who presented to the emergency department on 11/30/18 with a 
complaint of shortness of breath, who was admitted to the hospitalist service, 
found to have acute on chronic systolic and diastolic heart failure.  The 
patient was found to be volume overloaded on exam and with noted BNP of 2205.  
On admission, he was found to have a chest x-ray consistent with pulmonary 
vascular congestion.  As well, he presented with a creatinine of 4.67, which 
trended up to 4.9 and which is quite elevated above his baseline of around 1.5 
to 1.8.  The patient was gently diuresed and his overall respiratory status has 
improved; however, he still remains on 2 L nasal cannula.  He has had a 
resolution of his shortness of breath; however, the oxygen has not been able to 
be titrated off and the plan is to send the patient home with oxygen of 2 L.  
Off oxygen, he was noted to have an O2 sat of 86% on room air today.

 

The patient was seen and evaluated by palliative physician, Dr. Beatris Jung, 
who discussed with the patient and his wife that he does qualify for hospice 
with his EF of 25% to 30% with systolic and diastolic heart failure.  EGFR of 
11.2.  Albumin is 2.6 and he has lost more than 10% of his weight in the last 
year showing malnutrition.  The patient made himself a DNR.  The patient opted 
to go home with hospice services.  Throughout the hospital, he refused a lot of 
lab draws, did not want invasive care.  I discussed with the patient and his 
wife that he is going home on a new medication, Zaroxolyn, which has been every 
day; however, I am going to make this every other day and if he does have an 
increase in shortness of breath or weight gain, he could take a p.r.n. dose on 
the days that he does not take it.

 

The patient was noted to have elevated troponins of 0.08 and 0.07 thought to be 
secondary to demand ischemia.

 

The patient's blood sugars have been fairly well controlled throughout the 
hospitalization.

 

He has remained hemodynamically stable throughout the hospitalization.

 

The patient is ambulating in his room with a walker with a steady gait and is 
stable for discharge to home.

 

Renal ultrasound showed, impression:

1.  Kidneys are increased for echogenicity suggestive of medical renal disease.

2.  No evidence for hydronephrosis.

3.  Possible small bilateral nonobstructing calculi.

4.  Lung scan V/Q nuclear medicine, impression:  No evidence of ventilation or 
perfusion mismatches.  This is consistent with a low probability scan for 
pulmonary emboli.

 

DISCHARGE PLAN:

1.  The patient will be discharged home with his wife, who agrees with the plan 
for discharge.

2.  Hospice sign on tomorrow at 10 a.m.

3.  The patient was encouraged to follow up with his primary care provider, Dr. Lori Hitchcock.

4.  The patient will be sent home with home oxygen.

 

TIME SPENT:  Approximately 60 minutes was spent on this discharge.

 

____________________________________ MARIBETH HOLCOMB, DANIELE

 

[id number]/CPS #: [id number]

CHELSEY